# Patient Record
Sex: FEMALE | Race: WHITE | NOT HISPANIC OR LATINO | Employment: FULL TIME | URBAN - METROPOLITAN AREA
[De-identification: names, ages, dates, MRNs, and addresses within clinical notes are randomized per-mention and may not be internally consistent; named-entity substitution may affect disease eponyms.]

---

## 2017-01-19 ENCOUNTER — TELEPHONE (OUTPATIENT)
Dept: BARIATRICS | Facility: CLINIC | Age: 57
End: 2017-01-19

## 2017-02-27 ENCOUNTER — NURSE TRIAGE (OUTPATIENT)
Dept: ADMINISTRATIVE | Facility: CLINIC | Age: 57
End: 2017-02-27

## 2017-02-27 NOTE — TELEPHONE ENCOUNTER
Reason for Disposition   Cough with cold symptoms (e.g., runny nose, postnasal drip, throat clearing) (all triage questions negative)    Protocols used:  COUGH - ACUTE [id number]    Pt calling with complaints of coughing, fever/temp 100.8 and sinus/nasal congestion. Care Advice given.  Advised UC if needed.

## 2017-05-04 RX ORDER — FLUOXETINE HYDROCHLORIDE 40 MG/1
CAPSULE ORAL
Qty: 30 CAPSULE | Refills: 5 | Status: SHIPPED | OUTPATIENT
Start: 2017-05-04 | End: 2019-05-28 | Stop reason: SDUPTHER

## 2018-01-30 ENCOUNTER — PATIENT MESSAGE (OUTPATIENT)
Dept: FAMILY MEDICINE | Facility: CLINIC | Age: 58
End: 2018-01-30

## 2018-02-15 ENCOUNTER — DOCUMENTATION ONLY (OUTPATIENT)
Dept: FAMILY MEDICINE | Facility: CLINIC | Age: 58
End: 2018-02-15

## 2018-02-15 NOTE — PROGRESS NOTES
Pre-Visit Chart Review  For Appointment Scheduled on 2-21-18    Health Maintenance Due   Topic Date Due    Pap Smear with HPV Cotest  02/01/2016    Influenza Vaccine  08/01/2017

## 2018-02-21 ENCOUNTER — OFFICE VISIT (OUTPATIENT)
Dept: FAMILY MEDICINE | Facility: CLINIC | Age: 58
End: 2018-02-21
Attending: FAMILY MEDICINE
Payer: COMMERCIAL

## 2018-02-21 ENCOUNTER — PATIENT MESSAGE (OUTPATIENT)
Dept: FAMILY MEDICINE | Facility: CLINIC | Age: 58
End: 2018-02-21

## 2018-02-21 ENCOUNTER — LAB VISIT (OUTPATIENT)
Dept: LAB | Facility: HOSPITAL | Age: 58
End: 2018-02-21
Attending: FAMILY MEDICINE
Payer: COMMERCIAL

## 2018-02-21 VITALS
OXYGEN SATURATION: 96 % | HEART RATE: 68 BPM | TEMPERATURE: 98 F | WEIGHT: 261.25 LBS | RESPIRATION RATE: 16 BRPM | DIASTOLIC BLOOD PRESSURE: 74 MMHG | SYSTOLIC BLOOD PRESSURE: 122 MMHG | HEIGHT: 63 IN | BODY MASS INDEX: 46.29 KG/M2

## 2018-02-21 DIAGNOSIS — E78.5 HYPERLIPIDEMIA, UNSPECIFIED HYPERLIPIDEMIA TYPE: ICD-10-CM

## 2018-02-21 DIAGNOSIS — Z00.00 ENCOUNTER FOR PREVENTIVE HEALTH EXAMINATION: Primary | ICD-10-CM

## 2018-02-21 DIAGNOSIS — F41.9 ANXIETY: ICD-10-CM

## 2018-02-21 DIAGNOSIS — Z00.00 ENCOUNTER FOR PREVENTIVE HEALTH EXAMINATION: ICD-10-CM

## 2018-02-21 DIAGNOSIS — F32.5 MAJOR DEPRESSION IN REMISSION: ICD-10-CM

## 2018-02-21 DIAGNOSIS — E66.01 MORBID OBESITY WITH BMI OF 45.0-49.9, ADULT: ICD-10-CM

## 2018-02-21 LAB
ALBUMIN SERPL BCP-MCNC: 3.5 G/DL
ALP SERPL-CCNC: 88 U/L
ALT SERPL W/O P-5'-P-CCNC: 19 U/L
ANION GAP SERPL CALC-SCNC: 8 MMOL/L
AST SERPL-CCNC: 19 U/L
BASOPHILS # BLD AUTO: 0.07 K/UL
BASOPHILS NFR BLD: 0.9 %
BILIRUB SERPL-MCNC: 0.4 MG/DL
BUN SERPL-MCNC: 19 MG/DL
CALCIUM SERPL-MCNC: 9.1 MG/DL
CHLORIDE SERPL-SCNC: 105 MMOL/L
CHOLEST SERPL-MCNC: 185 MG/DL
CHOLEST/HDLC SERPL: 4.4 {RATIO}
CO2 SERPL-SCNC: 27 MMOL/L
CREAT SERPL-MCNC: 0.8 MG/DL
DIFFERENTIAL METHOD: NORMAL
EOSINOPHIL # BLD AUTO: 0.2 K/UL
EOSINOPHIL NFR BLD: 2.5 %
ERYTHROCYTE [DISTWIDTH] IN BLOOD BY AUTOMATED COUNT: 13.6 %
EST. GFR  (AFRICAN AMERICAN): >60 ML/MIN/1.73 M^2
EST. GFR  (NON AFRICAN AMERICAN): >60 ML/MIN/1.73 M^2
GLUCOSE SERPL-MCNC: 95 MG/DL
HCT VFR BLD AUTO: 39.1 %
HDLC SERPL-MCNC: 42 MG/DL
HDLC SERPL: 22.7 %
HGB BLD-MCNC: 12.7 G/DL
IMM GRANULOCYTES # BLD AUTO: 0.02 K/UL
IMM GRANULOCYTES NFR BLD AUTO: 0.2 %
LDLC SERPL CALC-MCNC: 111.6 MG/DL
LYMPHOCYTES # BLD AUTO: 2.1 K/UL
LYMPHOCYTES NFR BLD: 25.8 %
MCH RBC QN AUTO: 29.4 PG
MCHC RBC AUTO-ENTMCNC: 32.5 G/DL
MCV RBC AUTO: 91 FL
MONOCYTES # BLD AUTO: 0.6 K/UL
MONOCYTES NFR BLD: 7 %
NEUTROPHILS # BLD AUTO: 5.1 K/UL
NEUTROPHILS NFR BLD: 63.6 %
NONHDLC SERPL-MCNC: 143 MG/DL
NRBC BLD-RTO: 0 /100 WBC
PLATELET # BLD AUTO: 307 K/UL
PMV BLD AUTO: 10.4 FL
POTASSIUM SERPL-SCNC: 4.4 MMOL/L
PROT SERPL-MCNC: 7 G/DL
RBC # BLD AUTO: 4.32 M/UL
SODIUM SERPL-SCNC: 140 MMOL/L
TRIGL SERPL-MCNC: 157 MG/DL
WBC # BLD AUTO: 8.03 K/UL

## 2018-02-21 PROCEDURE — 80061 LIPID PANEL: CPT

## 2018-02-21 PROCEDURE — 85025 COMPLETE CBC W/AUTO DIFF WBC: CPT

## 2018-02-21 PROCEDURE — 36415 COLL VENOUS BLD VENIPUNCTURE: CPT | Mod: PO

## 2018-02-21 PROCEDURE — 99999 PR PBB SHADOW E&M-EST. PATIENT-LVL IV: CPT | Mod: PBBFAC,,, | Performed by: FAMILY MEDICINE

## 2018-02-21 PROCEDURE — 80053 COMPREHEN METABOLIC PANEL: CPT

## 2018-02-21 PROCEDURE — 99396 PREV VISIT EST AGE 40-64: CPT | Mod: S$GLB,,, | Performed by: FAMILY MEDICINE

## 2018-02-21 RX ORDER — BUSPIRONE HYDROCHLORIDE 10 MG/1
10 TABLET ORAL 3 TIMES DAILY PRN
COMMUNITY
Start: 2016-08-12 | End: 2019-07-18

## 2018-02-21 NOTE — PROGRESS NOTES
Subjective:       Patient ID: Amanda Clark is a 58 y.o. female.    Chief Complaint: Annual Exam    58-year-old female coming in for physical exam.  She has a history of hyperlipidemia, anxiety and depression, arthritis, chronic back pain with herniated disc and laminectomy in .  She also has psoriasis and arthritis with bilateral total knee replacements.  She has no major complaints at this time but his gained some weight recently and has just started back on the [employer] program.  Her Pap smear and mammogram were recently done by Dr. Garcia with a mild abnormality on mammogram.  She was recommended to have a follow-up in 6 months for what is believed to be a cyst.  Her health maintenance is otherwise up-to-date.    Past Medical History:  No date: Anxiety  No date: Arthritis  No date: Back pain  No date: Cancer      Comment: precancerous skin lesion  No date: Depression  No date: Herniated disc      Comment: L-4   No date: Hyperlipidemia  No date: Knee pain  No date: Psoriasis    Past Surgical History:  No date: ADENOIDECTOMY  No date: arthroscopic left knee  No date: BACK injections      Comment:  & 2013  No date:  SECTION  1/25/10: COLONOSCOPY      Comment: Dr. Coronado, 10 year recheck  2016: JOINT REPLACEMENT      Comment: right knee   2015: JOINT REPLACEMENT      Comment: left knee   2015: LAMINOTOMY      Comment: Dr Lazo  No date: STEROID INJECTION KNEE  No date: TONSILLECTOMY  No date: vocal cord nodule removal    Review of patient's family history indicates:    Heart disease                  Mother                    Diabetes                       Mother                    Psoriasis                      Mother                    Cancer                         Father                      Comment: bone, prostate    Heart disease                  Sister                    Diabetes                       Sister                    Hypertension                    Sister                    Depression                     Sister                    Heart disease                  Maternal Aunt             Dementia                       Maternal Aunt             Cancer                         Paternal Uncle              Comment: tumor abdominal    Heart disease                  Paternal Uncle            Dementia                       Paternal Uncle            No Known Problems              Daughter                  Melanoma                       Neg Hx                    Lupus                          Neg Hx                    Eczema                         Neg Hx                    Social History    Marital status:             Spouse name:                       Years of education:                 Number of children:               Occupational History  Occupation          Employer            Comment                                   Social Security De*     Social History Main Topics    Smoking status: Former Smoker                                                                Packs/day: 0.00      Years: 0.00           Types: Cigarettes       Quit date: 2/27/1988    Smokeless tobacco: Never Used                        Alcohol use: Yes                Comment: sometimes    Drug use: No              Sexual activity: No                       Current Outpatient Prescriptions:     busPIRone (BUSPAR) 10 MG tablet, Take 10 mg by mouth 3 (three) times daily as needed., Disp: , Rfl:     clobetasol (TEMOVATE) 0.05 % external solution, Use on scalp one - two times daily as needed for scaling or itching, Disp: 60 mL, Rfl: 3    fluoxetine (PROZAC) 40 MG capsule, TAKE ONE CAPSULE BY MOUTH ONCE DAILY (Patient taking differently: TAKE ONE CAPSULE BY MOUTH ONCE DAILY prn), Disp: 30 capsule, Rfl: 5    hydrocortisone 2.5 % cream, AAA face TWICE DAILY for 1-2 weeks PRN flares, Disp: 30 g, Rfl: 2    JUBLIA 10 % Melva, Apply to affected nail(s) qd, Disp: 4 mL, Rfl: 3    ketoconazole  (NIZORAL) 2 % shampoo, Wash hair with medicated shampoo at least 2x/week - let sit on scalp at least 5 minutes prior to rinsing (Patient taking differently: daily as needed. Wash hair with medicated shampoo at least 2x/week - let sit on scalp at least 5 minutes prior to rinsing), Disp: 120 mL, Rfl: 5    Pap Smear with HPV Cotest due on 01/30/1980-done, records requested        Review of Systems   Constitutional: Negative for chills, diaphoresis, fatigue, fever and unexpected weight change.   HENT: Negative for congestion, ear pain, hearing loss, postnasal drip and sinus pressure.    Eyes: Negative for itching and visual disturbance.   Respiratory: Negative for cough, chest tightness, shortness of breath and wheezing.    Cardiovascular: Negative for chest pain, palpitations and leg swelling.   Gastrointestinal: Negative for abdominal pain, blood in stool, constipation, diarrhea, nausea and vomiting.   Genitourinary: Negative for dysuria, frequency, hematuria, menstrual problem, pelvic pain, vaginal bleeding and vaginal discharge.   Musculoskeletal: Negative for arthralgias, back pain, joint swelling and myalgias.   Neurological: Negative for dizziness and headaches.   Hematological: Negative for adenopathy.   Psychiatric/Behavioral: Negative for sleep disturbance. The patient is not nervous/anxious.        Objective:      Physical Exam   Constitutional: She is oriented to person, place, and time. She appears well-developed. No distress.   Good blood pressure control  Morbidly obese with a BMI of 46.7 she is up 10.5 pounds from her last visit with me May 18, 2016   HENT:   Head: Normocephalic and atraumatic.   Right Ear: External ear normal.   Left Ear: External ear normal.   Nose: Nose normal.   Mouth/Throat: Oropharynx is clear and moist. No oropharyngeal exudate.   Eyes: Conjunctivae are normal. Pupils are equal, round, and reactive to light. Right eye exhibits no discharge. Left eye exhibits no discharge. No  scleral icterus.   Neck: Normal range of motion. Neck supple. No JVD present. No tracheal deviation present. No thyromegaly present.   Cardiovascular: Normal rate, regular rhythm and normal heart sounds.  Exam reveals no gallop and no friction rub.    No murmur heard.  Carotid pulses 2+ no bruit   Pulmonary/Chest: Effort normal and breath sounds normal. No respiratory distress. She has no wheezes. She has no rales. She exhibits no tenderness.   Abdominal: Soft. Bowel sounds are normal. She exhibits no distension and no mass. There is no tenderness. There is no rebound and no guarding.   Musculoskeletal: Normal range of motion. She exhibits no edema or tenderness.   Lymphadenopathy:     She has no cervical adenopathy.   Neurological: She is alert and oriented to person, place, and time. She has normal reflexes. She displays normal reflexes. No cranial nerve deficit or sensory deficit. She exhibits normal muscle tone.   Skin: Skin is warm and dry. No rash noted. She is not diaphoretic. No erythema.   Psychiatric: She has a normal mood and affect. Her behavior is normal. Thought content normal.   Nursing note and vitals reviewed.      Assessment:       1. Encounter for preventive health examination    2. Major depression in remission    3. Anxiety    4. Hyperlipidemia, unspecified hyperlipidemia type    5. Morbid obesity with BMI of 45.0-49.9, adult        Plan:       1. Encounter for preventive health examination  - Comprehensive metabolic panel; Future  - CBC auto differential; Future  - Lipid panel; Future    2. Major depression in remission  She is resuming her Prozac and BuSpar    3. Anxiety  She is resuming her Prozac and BuSpar    4. Hyperlipidemia, unspecified hyperlipidemia type  - Lipid panel; Future    5. Morbid obesity with BMI of 45.0-49.9, adult         Patient readiness: acceptance and barriers:none    During the course of the visit the patient was educated and counseled about the following:     Obesity:    General weight loss/lifestyle modification strategies discussed (elicit support from others; identify saboteurs; non-food rewards, etc).  Informal exercise measures discussed, e.g. taking stairs instead of elevator.  Regular aerobic exercise program discussed.    Goals: Obesity: Reduce calorie intake and BMI    Did patient meet goals/outcomes: No    The following self management tools provided: excercise log    Patient Instructions (the written plan) was given to the patient/family.     Time spent with patient: 45 minutes

## 2018-02-21 NOTE — PATIENT INSTRUCTIONS

## 2018-07-09 ENCOUNTER — PATIENT OUTREACH (OUTPATIENT)
Dept: ADMINISTRATIVE | Facility: HOSPITAL | Age: 58
End: 2018-07-09

## 2018-07-11 ENCOUNTER — OFFICE VISIT (OUTPATIENT)
Dept: FAMILY MEDICINE | Facility: CLINIC | Age: 58
End: 2018-07-11
Attending: FAMILY MEDICINE
Payer: COMMERCIAL

## 2018-07-11 VITALS
SYSTOLIC BLOOD PRESSURE: 132 MMHG | DIASTOLIC BLOOD PRESSURE: 78 MMHG | TEMPERATURE: 98 F | HEIGHT: 62 IN | RESPIRATION RATE: 17 BRPM | OXYGEN SATURATION: 97 % | HEART RATE: 76 BPM | BODY MASS INDEX: 49.37 KG/M2 | WEIGHT: 268.31 LBS

## 2018-07-11 DIAGNOSIS — M70.62 TROCHANTERIC BURSITIS OF BOTH HIPS: Primary | ICD-10-CM

## 2018-07-11 DIAGNOSIS — M70.61 TROCHANTERIC BURSITIS OF BOTH HIPS: Primary | ICD-10-CM

## 2018-07-11 DIAGNOSIS — G56.01 CARPAL TUNNEL SYNDROME OF RIGHT WRIST: ICD-10-CM

## 2018-07-11 PROCEDURE — 99999 PR PBB SHADOW E&M-EST. PATIENT-LVL III: CPT | Mod: PBBFAC,,, | Performed by: FAMILY MEDICINE

## 2018-07-11 PROCEDURE — 99213 OFFICE O/P EST LOW 20 MIN: CPT | Mod: S$GLB,,, | Performed by: FAMILY MEDICINE

## 2018-07-11 PROCEDURE — 3008F BODY MASS INDEX DOCD: CPT | Mod: CPTII,S$GLB,, | Performed by: FAMILY MEDICINE

## 2018-07-11 NOTE — PROGRESS NOTES
Subjective:       Patient ID: Amanda Clark is a 58 y.o. female.    Chief Complaint: Back Pain and Hip Pain    58-year-old female coming in with several concerns.  She's been experiencing pain in both buttocks radiating down over the greater trochanter and into the lateral ventricle anterior thigh at the knee.  It does not radiate below the knee and there is no numbness or tingling down the lower leg.  She has been painting her house climbing a 16 foot high ladder to trim.  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  She did get some relief with Advil and Aleve but she was concerned that she may have a back problem.  Her other complaint is some numbness and tingling in the hands, bilateral but mostly affecting the right side.  This occurs somewhat at work where she spends a lot of time typing on a keyboard and often awakens her at night.    Past Medical History:  No date: Anxiety  No date: Arthritis  No date: Back pain  No date: Cancer      Comment: precancerous skin lesion  No date: Depression  No date: Herniated disc      Comment: L-4   No date: Hyperlipidemia  No date: Knee pain  No date: Psoriasis    Past Surgical History:  No date: ADENOIDECTOMY  No date: arthroscopic left knee  No date: BACK injections      Comment:  & 2013  No date:  SECTION  1/25/10: COLONOSCOPY      Comment: Dr. Coronado, 10 year recheck  2016: JOINT REPLACEMENT      Comment: right knee   2015: JOINT REPLACEMENT      Comment: left knee   2015: LAMINOTOMY      Comment: Dr Lazo  No date: STEROID INJECTION KNEE  No date: TONSILLECTOMY  No date: vocal cord nodule removal    Current Outpatient Prescriptions on File Prior to Visit:  busPIRone (BUSPAR) 10 MG tablet, Take 10 mg by mouth 3 (three) times daily as needed., Disp: , Rfl:   clobetasol (TEMOVATE) 0.05 % external solution, Use on scalp one  - two times daily as needed for scaling or itching, Disp: 60 mL, Rfl: 3  fluoxetine (PROZAC) 40 MG capsule, TAKE ONE CAPSULE BY MOUTH ONCE DAILY (Patient taking differently: TAKE ONE CAPSULE BY MOUTH ONCE DAILY prn), Disp: 30 capsule, Rfl: 5  hydrocortisone 2.5 % cream, AAA face TWICE DAILY for 1-2 weeks PRN flares, Disp: 30 g, Rfl: 2  ketoconazole (NIZORAL) 2 % shampoo, Wash hair with medicated shampoo at least 2x/week - let sit on scalp at least 5 minutes prior to rinsing (Patient taking differently: daily as needed. Wash hair with medicated shampoo at least 2x/week - let sit on scalp at least 5 minutes prior to rinsing), Disp: 120 mL, Rfl: 5  (DISCONTINUED) JUBLIA 10 % Melva, Apply to affected nail(s) qd, Disp: 4 mL, Rfl: 3    No current facility-administered medications on file prior to visit.           Review of Systems   Musculoskeletal: Positive for arthralgias and back pain.   Neurological: Positive for numbness.       Objective:      Physical Exam   Constitutional:   Good blood pressure control  Morbidly obesity with a BMI of 49.1, she is up 7.1 pounds from her last visit with me February 21, 2018   Musculoskeletal:        Right elbow: Normal.       Right wrist: Normal.        Right hip: She exhibits bony tenderness (Very tender over the greater trochanter which reproduces her pain). She exhibits normal range of motion, normal strength, no swelling, no crepitus and no deformity.        Left hip: She exhibits bony tenderness (Very tender over the greater trochanter which reproduces her pain). She exhibits normal range of motion, normal strength, no swelling, no crepitus and no deformity.   Nursing note and vitals reviewed.      Assessment:       1. Trochanteric bursitis of both hips    2. Carpal tunnel syndrome of right wrist        Plan:       1. Trochanteric bursitis of both hips  Stretching exercises, cold compresses/ice packs, Advil and/or Aleve as needed.  If she does not improve readily will send to  physical therapy.  Cortisone shots or an option but I would tend to prefer to avoid them if not absolutely necessary    2. Carpal tunnel syndrome of right wrist  Splint at night, wrist protectors on her keyboard and mouse pad.  May inject if needed

## 2018-11-09 ENCOUNTER — TELEPHONE (OUTPATIENT)
Dept: ADMINISTRATIVE | Facility: HOSPITAL | Age: 58
End: 2018-11-09

## 2018-12-03 ENCOUNTER — DOCUMENTATION ONLY (OUTPATIENT)
Dept: FAMILY MEDICINE | Facility: CLINIC | Age: 58
End: 2018-12-03

## 2018-12-03 NOTE — PROGRESS NOTES
Pre-Visit Chart Review  For Appointment Scheduled on 2-22-19    Health Maintenance Due   Topic Date Due    Pap Smear with HPV Cotest  01/30/1980

## 2018-12-03 NOTE — PROGRESS NOTES
Pre-Visit Chart Review  For Appointment Scheduled on 1-22-19    Health Maintenance Due   Topic Date Due    Pap Smear with HPV Cotest  01/30/1980

## 2019-01-04 DIAGNOSIS — M54.50 LUMBAR PAIN: Primary | ICD-10-CM

## 2019-01-07 ENCOUNTER — OFFICE VISIT (OUTPATIENT)
Dept: DERMATOLOGY | Facility: CLINIC | Age: 59
End: 2019-01-07
Payer: COMMERCIAL

## 2019-01-07 DIAGNOSIS — L81.4 SOLAR LENTIGO: ICD-10-CM

## 2019-01-07 DIAGNOSIS — L29.9 BRACHIORADIAL PRURITUS: ICD-10-CM

## 2019-01-07 DIAGNOSIS — L57.0 ACTINIC KERATOSES: Primary | ICD-10-CM

## 2019-01-07 DIAGNOSIS — L82.1 SEBORRHEIC KERATOSES: ICD-10-CM

## 2019-01-07 PROCEDURE — 17003 DESTRUCTION, PREMALIGNANT LESIONS; SECOND THROUGH 14 LESIONS: ICD-10-PCS | Mod: S$GLB,,, | Performed by: DERMATOLOGY

## 2019-01-07 PROCEDURE — 99213 PR OFFICE/OUTPT VISIT, EST, LEVL III, 20-29 MIN: ICD-10-PCS | Mod: 25,S$GLB,, | Performed by: DERMATOLOGY

## 2019-01-07 PROCEDURE — 99999 PR PBB SHADOW E&M-EST. PATIENT-LVL II: CPT | Mod: PBBFAC,,, | Performed by: DERMATOLOGY

## 2019-01-07 PROCEDURE — 17000 DESTRUCT PREMALG LESION: CPT | Mod: S$GLB,,, | Performed by: DERMATOLOGY

## 2019-01-07 PROCEDURE — 99213 OFFICE O/P EST LOW 20 MIN: CPT | Mod: 25,S$GLB,, | Performed by: DERMATOLOGY

## 2019-01-07 PROCEDURE — 17003 DESTRUCT PREMALG LES 2-14: CPT | Mod: S$GLB,,, | Performed by: DERMATOLOGY

## 2019-01-07 PROCEDURE — 17000 PR DESTRUCTION(LASER SURGERY,CRYOSURGERY,CHEMOSURGERY),PREMALIGNANT LESIONS,FIRST LESION: ICD-10-PCS | Mod: S$GLB,,, | Performed by: DERMATOLOGY

## 2019-01-07 PROCEDURE — 99999 PR PBB SHADOW E&M-EST. PATIENT-LVL II: ICD-10-PCS | Mod: PBBFAC,,, | Performed by: DERMATOLOGY

## 2019-01-07 NOTE — PROGRESS NOTES
Subjective:       Patient ID:  Amanda Clark is a 58 y.o. female who presents for   Chief Complaint   Patient presents with    Spot     forehead    Itching     R arm     Patient last seen 12/2016 with AKs treated with cryo  H/o seb derm and benign lesions  New complaint today      Spot  - Initial  Affected locations: forehead  Duration: 6 months  Signs / symptoms: dryness and non-healing  Severity: mild  Timing: constant  Aggravated by: nothing  Relieving factors/Treatments tried: OTC hydrocortisone  Improvement on treatment: no relief    Itching  - Initial  Affected locations: right arm  Duration: 4 months  Signs / symptoms: itching and dryness  Severity: mild  Timing: constant  Aggravated by: nothing  Relieving factors/Treatments tried: OTC hydrocortisone  Improvement on treatment: no relief        Review of Systems   Constitutional: Negative for fever, chills and fatigue.   Skin: Positive for itching (R arm), activity-related sunscreen use and wears hat. Negative for daily sunscreen use.   Hematologic/Lymphatic: Does not bruise/bleed easily.        Objective:    Physical Exam   Constitutional: She appears well-developed and well-nourished. No distress.   Neurological: She is alert and oriented to person, place, and time. She is not disoriented.   Psychiatric: She has a normal mood and affect.   Skin:   Areas Examined (abnormalities noted in diagram):   Head / Face Inspection Performed  Neck Inspection Performed  Back Inspection Performed  RUE Inspected  LUE Inspection Performed                       Diagram Legend     Erythematous scaling macule/papule c/w actinic keratosis       Vascular papule c/w angioma      Pigmented verrucoid papule/plaque c/w seborrheic keratosis      Yellow umbilicated papule c/w sebaceous hyperplasia      Irregularly shaped tan macule c/w lentigo     1-2 mm smooth white papules consistent with Milia      Movable subcutaneous cyst with punctum c/w epidermal inclusion cyst       Subcutaneous movable cyst c/w pilar cyst      Firm pink to brown papule c/w dermatofibroma      Pedunculated fleshy papule(s) c/w skin tag(s)      Evenly pigmented macule c/w junctional nevus     Mildly variegated pigmented, slightly irregular-bordered macule c/w mildly atypical nevus      Flesh colored to evenly pigmented papule c/w intradermal nevus       Pink pearly papule/plaque c/w basal cell carcinoma      Erythematous hyperkeratotic cursted plaque c/w SCC      Surgical scar with no sign of skin cancer recurrence      Open and closed comedones      Inflammatory papules and pustules      Verrucoid papule consistent consistent with wart     Erythematous eczematous patches and plaques     Dystrophic onycholytic nail with subungual debris c/w onychomycosis     Umbilicated papule    Erythematous-base heme-crusted tan verrucoid plaque consistent with inflamed seborrheic keratosis     Erythematous Silvery Scaling Plaque c/w Psoriasis     See annotation      Assessment / Plan:        Actinic keratoses  Cryosurgery Procedure Note    Verbal consent from the patient is obtained and the patient is aware of the precancerous quality and need for treatment of these lesions. Liquid nitrogen cryosurgery is applied to the 2 actinic keratoses, as detailed in the physical exam, to produce a freeze injury. The patient is aware that blisters may form and is instructed on wound care with gentle cleansing and use of vaseline ointment to keep moist until healed. The patient is supplied a handout on cryosurgery and is instructed to call if lesions do not completely resolve.    Seborrheic keratoses  These are benign inherited growths without a malignant potential. Reassurance given to patient. No treatment is necessary.     Solar lentigo  This is a benign hyperpigmented sun induced lesion. Daily sun protection will reduce the number of new lesions. Treatment of these benign lesions are considered cosmetic.    Brachioradial pruritus vs  other, R forearm  Started last summer, improved in recent months  Denies h/o intense sun exposure  No exanthem to explain pruritus  cerave with pramoxine, strict sun protection    Patient instructed in importance in daily sun protection of at least spf 30. Mineral sunscreen ingredients preferred (Zinc +/- Titanium).   Recommend Elta MD for daily use on face and neck.  Patient encouraged to wear hat for all outdoor exposure.   Also discussed sun avoidance and use of protective clothing.           Follow-up if symptoms worsen or fail to improve.

## 2019-01-07 NOTE — PATIENT INSTRUCTIONS
XEROSIS (DRY SKIN)    Xerosis is the term for dry skin.  We all have a natural oil coating over our skin produced by the skin oil glands.  If this oil is removed, the skin becomes dry which can lead to cracking, which can lead to inflammation. Xerosis is usually a long-term problem that recurs often, especially in the winter.    1. Cause     Long hot baths or showers can remove our natural oil and lead to xerosis.  One should never take more than one bath or shower a day and for no longer than ten minutes.   Use of harsh soaps such as Zest, Dial, and Ivory can worsen and cause xerosis.   Cold winter weather worsens xerosis because the amount of moisture contained in cold air is much less than the amount of moisture in warm air.    2. Treatment     Treatment is intended to restore the natural oil to your skin.  Keep the skin lubricated.     Do not take more than one bath or shower a day.  Use lukewarm water, not hot.  Hot water dries out the skin.     Use a gentle moisturizing soap such as Cetaphil soap, cerave gentle cleanser, Oil of Olay, Dove sensitive bar, Basis, Ivory moisture care, Restoraderm cleanser.     When toweling dry, dont rub.  Blot the skin so there is still some water left on the skin.  Immediately after toweling, you should apply a moisturizing cream from neck to toes such as Cerave cream, Cetaphil cream, Restoraderm or Eucerin Original Formula cream.  Alpha hydroxyacid lotions, i.e., AmLactin or Cerave SA, also work very well for preventing dry skin, but may burn when used on inflamed or reddened skin.     If you like to swim during the winter months, you should not use soap when getting out of the pool.  When you have finished swimming, rinse off the chlorine with cool to warm water.  If this will be the only shower of the day, then you may use Cetaphil or another mild soap to cleanse your skin.  After the shower, apply a moisturizing cream to all of the skin as above.    3. Additional  recommendation:      Use unscented hypoallergenic detergent for your clothes, avoid softener or dryer sheets unless they are unscented and hypoallergenic (all free and clear; downy free and gentle).    [facility]; [address] [place] [address] Tel: [phone] Fax: [phone]

## 2019-01-14 ENCOUNTER — OFFICE VISIT (OUTPATIENT)
Dept: ORTHOPEDICS | Facility: CLINIC | Age: 59
End: 2019-01-14
Payer: COMMERCIAL

## 2019-01-14 VITALS
WEIGHT: 262 LBS | DIASTOLIC BLOOD PRESSURE: 70 MMHG | SYSTOLIC BLOOD PRESSURE: 118 MMHG | HEART RATE: 82 BPM | HEIGHT: 62 IN | BODY MASS INDEX: 48.21 KG/M2

## 2019-01-14 DIAGNOSIS — Z98.890 HISTORY OF LUMBAR LAMINECTOMY: Primary | ICD-10-CM

## 2019-01-14 DIAGNOSIS — M48.062 LUMBAR STENOSIS WITH NEUROGENIC CLAUDICATION: ICD-10-CM

## 2019-01-14 PROCEDURE — 99203 PR OFFICE/OUTPT VISIT, NEW, LEVL III, 30-44 MIN: ICD-10-PCS | Mod: ,,, | Performed by: ORTHOPAEDIC SURGERY

## 2019-01-14 PROCEDURE — 99203 OFFICE O/P NEW LOW 30 MIN: CPT | Mod: ,,, | Performed by: ORTHOPAEDIC SURGERY

## 2019-01-14 PROCEDURE — 3008F PR BODY MASS INDEX (BMI) DOCUMENTED: ICD-10-PCS | Mod: ,,, | Performed by: ORTHOPAEDIC SURGERY

## 2019-01-14 PROCEDURE — 3008F BODY MASS INDEX DOCD: CPT | Mod: ,,, | Performed by: ORTHOPAEDIC SURGERY

## 2019-01-14 NOTE — PROGRESS NOTES
Subjective:       Patient ID: Amanda Clark is a 58 y.o. female.    Chief Complaint: Pain of the Lumbar Spine (Lumbar pain x 6 months with bilateral leg weakness to knees with tingling. Limited standing and walking off and on. No bowel or bladder problems.)      History of Present Illness  Rosa presents with back and leg pain bilateral leg pains radiating down both legs to level of her knees. She had a laminectomy at L2 and L3 performed by myself in 2015 and did well until 6 months ago. Back pain is greater than leg pain. No bowel or bladder incontinence.    Current Medications  Current Outpatient Medications   Medication Sig Dispense Refill    busPIRone (BUSPAR) 10 MG tablet Take 10 mg by mouth 3 (three) times daily as needed.      clobetasol (TEMOVATE) 0.05 % external solution Use on scalp one - two times daily as needed for scaling or itching 60 mL 3    fluoxetine (PROZAC) 40 MG capsule TAKE ONE CAPSULE BY MOUTH ONCE DAILY (Patient taking differently: TAKE ONE CAPSULE BY MOUTH ONCE DAILY prn) 30 capsule 5    hydrocortisone 2.5 % cream AAA face TWICE DAILY for 1-2 weeks PRN flares 30 g 2    ketoconazole (NIZORAL) 2 % shampoo Wash hair with medicated shampoo at least 2x/week - let sit on scalp at least 5 minutes prior to rinsing (Patient taking differently: daily as needed. Wash hair with medicated shampoo at least 2x/week - let sit on scalp at least 5 minutes prior to rinsing) 120 mL 5     No current facility-administered medications for this visit.        Allergies  Review of patient's allergies indicates:   Allergen Reactions    No known drug allergies        Past Medical History  Past Medical History:   Diagnosis Date    Anxiety     Arthritis     Back pain     Cancer     precancerous skin lesion    Depression     Herniated disc     L-4     Hyperlipidemia     Knee pain     Psoriasis        Surgical History  Past Surgical History:   Procedure Laterality Date    ADENOIDECTOMY       arthroscopic left knee      BACK injections       & 2013     SECTION      COLONOSCOPY  1/25/10    Dr. Coronado, 10 year recheck    JOINT REPLACEMENT  2016    right knee     JOINT REPLACEMENT  2015    left knee     LAMINOTOMY  2015    Dr Lazo    STEROID INJECTION KNEE      TONSILLECTOMY      vocal cord nodule removal         Family History:   Family History   Problem Relation Age of Onset    Heart disease Mother     Diabetes Mother     Psoriasis Mother     Cancer Father         bone, prostate    Heart disease Sister     Diabetes Sister     Hypertension Sister     Depression Sister     Heart disease Maternal Aunt     Dementia Maternal Aunt     Cancer Paternal Uncle         tumor abdominal    Heart disease Paternal Uncle     Dementia Paternal Uncle     No Known Problems Daughter     Melanoma Neg Hx     Lupus Neg Hx     Eczema Neg Hx        Social History:   Social History     Socioeconomic History    Marital status:      Spouse name: Not on file    Number of children: Not on file    Years of education: Not on file    Highest education level: Not on file   Social Needs    Financial resource strain: Not on file    Food insecurity - worry: Not on file    Food insecurity - inability: Not on file    Transportation needs - medical: Not on file    Transportation needs - non-medical: Not on file   Occupational History     Employer: [employer]   Tobacco Use    Smoking status: Former Smoker     Types: Cigarettes     Last attempt to quit: 1988     Years since quittin.9    Smokeless tobacco: Never Used   Substance and Sexual Activity    Alcohol use: Yes     Comment: sometimes    Drug use: No    Sexual activity: No     Birth control/protection: None   Other Topics Concern    Are you pregnant or think you may be? Not Asked    Breast-feeding Not Asked   Social History Narrative    Not on file       Hospitalization/Major  Diagnostic Procedure:     Review of Systems     General/Constitutional:  Chills denies. Fatigue denies. Fever denies. Weight gain denies. Weight loss denies.    Respiratory:  Shortness of breath denies.    Cardiovascular:  Chest pain denies.    Gastrointestinal:  Constipation denies. Diarrhea denies. Nausea denies. Vomiting denies.     Hematology:  Easy bruising denies. Prolonged bleeding denies.     Genitourinary:  Frequent urination denies. Pain in lower back denies. Painful urination denies.     Musculoskeletal:  See HPI for details    Skin:  Rash denies.    Neurologic:  Dizziness denies. Gait abnormalities denies. Seizures denies. Tingling/Numbess denies.    Psychiatric:  Anxiety denies. Depressed mood denies.     Objective:   Vital Signs:   Vitals:    01/14/19 0848   BP: 118/70   Pulse: 82        Physical Exam      General Examination:     Constitutional: The patient is alert and oriented to lace person and time. Mood is pleasant.     Head/Face: Normal facial features normal eyebrows    Eyes: Normal extraocular motion bilaterally    Lungs: Respirations are equal and unlabored    Gait is coordinated.    Cardiovascular: There are no swelling or varicosities present.    Lymphatic: Negative for adenopathy    Skin: Normal    Neurological: Level of consciousness normal. Oriented to place person and time and situation    Psychiatric: Oriented to time place person and situation    Lumbar exam shows well-healed lumbar incision moderate tenderness bilaterally L3-4 with mild spasm range of motion moderately restricted straight leg raising negative bilaterally reflexes symmetrical but hypoactive.    XRAY Report/ Interpretation : Prior x-rays were reviewed. AP pelvis x-ray was taken today. Indications low back pain and/or hip pain. Findings AP pelvis x-ray appears to be normal with no evidence of fractures or significant degenerative diseaseAP and lateral x-rays of the lumbar spine will performed today. Indications postop  lumbar spine surgery. Findings: Evidence of previous laminotomy on the left side L2-3 L3-4. Moderate asymmetrical disc space narrowing noted L3-4 and L4-5.      Assessment:       1. History of lumbar laminectomy    2. Lumbar stenosis with neurogenic claudication        Plan:       Amanda was seen today for pain.    Diagnoses and all orders for this visit:    History of lumbar laminectomy    Lumbar stenosis with neurogenic claudication         No Follow-up on file.    Treatment options were discussed lumbar MRI with and without gadolinium advised. Referral to outpatient physical therapy.    This note was created using Dragon voice recognition software that occasionally misinterpreted phrases or words.

## 2019-01-14 NOTE — LETTER
January 14, 2019      Demetris Alves MD  [address]  [address]  [facility] [address]           [facility]  [address] [facility] [address]  [place] [address]  Phone: [phone]  Fax: [phone]          Patient: Amanda Clark   MR Number: [id number]   YOB: 1960   Date of Visit: 1/14/2019       Dear Dr. Demetris Alves:    Thank you for referring Amanda Clark to me for evaluation. Attached you will find relevant portions of my assessment and plan of care.    If you have questions, please do not hesitate to call me. I look forward to following Amanda Clark along with you.    Sincerely,    Rajesh Lazo MD    Enclosure  CC:  No Recipients    If you would like to receive this communication electronically, please contact [email or web address] or [phone] to request more information on [employer] Link access.    For providers and/or their staff who would like to refer a patient to [facility], please contact us through our one-stop-shop provider referral line, [facility], at [phone].    If you feel you have received this communication in error or would no longer like to receive these types of communications, please e-mail [email or web address]

## 2019-01-18 ENCOUNTER — TELEPHONE (OUTPATIENT)
Dept: ORTHOPEDICS | Facility: CLINIC | Age: 59
End: 2019-01-18

## 2019-01-18 NOTE — TELEPHONE ENCOUNTER
Pt called she would like the order for p/t to be changed to [facility] p/t in [place] fax# [phone] pts# [phone]

## 2019-01-28 ENCOUNTER — OFFICE VISIT (OUTPATIENT)
Dept: ORTHOPEDICS | Facility: CLINIC | Age: 59
End: 2019-01-28
Payer: COMMERCIAL

## 2019-01-28 VITALS
SYSTOLIC BLOOD PRESSURE: 128 MMHG | WEIGHT: 261 LBS | BODY MASS INDEX: 48.03 KG/M2 | HEART RATE: 73 BPM | HEIGHT: 62 IN | DIASTOLIC BLOOD PRESSURE: 80 MMHG

## 2019-01-28 DIAGNOSIS — M47.816 FACET ARTHRITIS OF LUMBAR REGION: Primary | ICD-10-CM

## 2019-01-28 DIAGNOSIS — Z98.890 HISTORY OF LUMBAR LAMINECTOMY: ICD-10-CM

## 2019-01-28 DIAGNOSIS — M51.36 DISC DEGENERATION, LUMBAR: ICD-10-CM

## 2019-01-28 PROCEDURE — 3008F PR BODY MASS INDEX (BMI) DOCUMENTED: ICD-10-PCS | Mod: ,,, | Performed by: ORTHOPAEDIC SURGERY

## 2019-01-28 PROCEDURE — 3008F BODY MASS INDEX DOCD: CPT | Mod: ,,, | Performed by: ORTHOPAEDIC SURGERY

## 2019-01-28 PROCEDURE — 99213 OFFICE O/P EST LOW 20 MIN: CPT | Mod: ,,, | Performed by: ORTHOPAEDIC SURGERY

## 2019-01-28 PROCEDURE — 99213 PR OFFICE/OUTPT VISIT, EST, LEVL III, 20-29 MIN: ICD-10-PCS | Mod: ,,, | Performed by: ORTHOPAEDIC SURGERY

## 2019-01-28 NOTE — PROGRESS NOTES
Subjective:       Patient ID: Amanda Clark is a 58 y.o. female.    Chief Complaint: Pain of the Lumbar Spine (MRI results)      History of Present Illness  Status post L2 and L3 laminectomy many years ago. Patient still has residual back pain and radiation to both thighs in a nonradicular pattern. She to discuss results of MRI.    Current Medications  Current Outpatient Medications   Medication Sig Dispense Refill    busPIRone (BUSPAR) 10 MG tablet Take 10 mg by mouth 3 (three) times daily as needed.      clobetasol (TEMOVATE) 0.05 % external solution Use on scalp one - two times daily as needed for scaling or itching 60 mL 3    fluoxetine (PROZAC) 40 MG capsule TAKE ONE CAPSULE BY MOUTH ONCE DAILY (Patient taking differently: TAKE ONE CAPSULE BY MOUTH ONCE DAILY prn) 30 capsule 5    hydrocortisone 2.5 % cream AAA face TWICE DAILY for 1-2 weeks PRN flares 30 g 2    ketoconazole (NIZORAL) 2 % shampoo Wash hair with medicated shampoo at least 2x/week - let sit on scalp at least 5 minutes prior to rinsing (Patient taking differently: daily as needed. Wash hair with medicated shampoo at least 2x/week - let sit on scalp at least 5 minutes prior to rinsing) 120 mL 5     No current facility-administered medications for this visit.        Allergies  Review of patient's allergies indicates:   Allergen Reactions    No known drug allergies        Past Medical History  Past Medical History:   Diagnosis Date    Anxiety     Arthritis     Back pain     Cancer     precancerous skin lesion    Depression     Herniated disc     L-4     Hyperlipidemia     Knee pain     Psoriasis        Surgical History  Past Surgical History:   Procedure Laterality Date    ADENOIDECTOMY      arthroscopic left knee      BACK injections       & 2013     SECTION      COLONOSCOPY  1/25/10    Dr. Coronado, 10 year recheck    JOINT REPLACEMENT  2016    right knee     JOINT REPLACEMENT  2015    left knee      LAMINOTOMY  2015    Dr Lazo    STEROID INJECTION KNEE      TONSILLECTOMY      vocal cord nodule removal         Family History:   Family History   Problem Relation Age of Onset    Heart disease Mother     Diabetes Mother     Psoriasis Mother     Cancer Father         bone, prostate    Heart disease Sister     Diabetes Sister     Hypertension Sister     Depression Sister     Heart disease Maternal Aunt     Dementia Maternal Aunt     Cancer Paternal Uncle         tumor abdominal    Heart disease Paternal Uncle     Dementia Paternal Uncle     No Known Problems Daughter     Melanoma Neg Hx     Lupus Neg Hx     Eczema Neg Hx        Social History:   Social History     Socioeconomic History    Marital status:      Spouse name: Not on file    Number of children: Not on file    Years of education: Not on file    Highest education level: Not on file   Social Needs    Financial resource strain: Not on file    Food insecurity - worry: Not on file    Food insecurity - inability: Not on file    Transportation needs - medical: Not on file    Transportation needs - non-medical: Not on file   Occupational History     Employer: [employer]   Tobacco Use    Smoking status: Former Smoker     Types: Cigarettes     Last attempt to quit: 1988     Years since quittin.9    Smokeless tobacco: Never Used   Substance and Sexual Activity    Alcohol use: Yes     Comment: sometimes    Drug use: No    Sexual activity: No     Birth control/protection: None   Other Topics Concern    Are you pregnant or think you may be? Not Asked    Breast-feeding Not Asked   Social History Narrative    Not on file       Hospitalization/Major Diagnostic Procedure:     Review of Systems     General/Constitutional:  Chills denies. Fatigue denies. Fever denies. Weight gain denies. Weight loss denies.    Respiratory:  Shortness of breath denies.    Cardiovascular:  Chest pain  denies.    Gastrointestinal:  Constipation denies. Diarrhea denies. Nausea denies. Vomiting denies.     Hematology:  Easy bruising denies. Prolonged bleeding denies.     Genitourinary:  Frequent urination denies. Pain in lower back denies. Painful urination denies.     Musculoskeletal:  See HPI for details    Skin:  Rash denies.    Neurologic:  Dizziness denies. Gait abnormalities denies. Seizures denies. Tingling/Numbess denies.    Psychiatric:  Anxiety denies. Depressed mood denies.     Objective:   Vital Signs:   Vitals:    01/28/19 0851   BP: 128/80   Pulse: 73        Physical Exam      General Examination:     Constitutional: The patient is alert and oriented to lace person and time. Mood is pleasant.     Head/Face: Normal facial features normal eyebrows    Eyes: Normal extraocular motion bilaterally    Lungs: Respirations are equal and unlabored    Gait is coordinated.    Cardiovascular: There are no swelling or varicosities present.    Lymphatic: Negative for adenopathy    Skin: Normal    Neurological: Level of consciousness normal. Oriented to place person and time and situation    Psychiatric: Oriented to time place person and situation    Exam shows well-healed incisions moderate tenderness paraspinous muscles and a moderate restriction of motion without spasm straight leg raising is negative  XRAY Report/ Interpretation : Prior x-rays were reviewed. AP pelvis x-ray was taken today. Indications low back pain and/or hip pain. Findings AP pelvis x-ray appears to be normal with no evidence of fractures or significant degenerative diseaseAP and lateral x-rays of the lumbar spine will performed today. Indications postop lumbar spine surgery. Findings: Evidence of previous laminotomy on the left side L2-3 L3-4. Moderate asymmetrical disc space narrowing noted L3-4   New MRI personally reviewed diminished signal intensity at L3-4 and L4-5 levels. Moderate multilevel facet arthritis. Left-sided disc protrusion  L3-4 and right-sided disc protrusion foraminal L4-5 both of which I think are asymptomatic  XRAY Report/ Interpretation:       Assessment:       1. Facet arthritis of lumbar region    2. History of lumbar laminectomy    3. Disc degeneration, lumbar        Plan:       Amanda was seen today for pain.    Diagnoses and all orders for this visit:    Facet arthritis of lumbar region    History of lumbar laminectomy  -     Ambulatory referral to Pain Clinic    Disc degeneration, lumbar  -     Ambulatory referral to Pain Clinic         Follow-up in about 6 weeks (around 3/11/2019) for Lumbar MBB follow up.    More likely not the cause of her pain as either discogenic from 2 level degenerative disc disease are from a facet joint arthritis she does not want have any surgery therefore we will refer her for lumbar medial branch blocks and possible rhizotomy literature was given regards to those type of treatment    This note was created using Dragon voice recognition software that occasionally misinterpreted phrases or words.

## 2019-01-28 NOTE — PATIENT INSTRUCTIONS
[facility]  [facility]      STEP 1: Diagnostic Medial Branch Blocks (Facet Nerve Blocks)    What are medial branch nerves? Why are medial branch blocks helpful?     Medial branch nerves are very small nerve branches that communicate pain caused by the facet joints in the spine. These nerves do not control any muscles or sensation in the arms or legs. They are located along a bony groove in the spine.     If this procedure has been scheduled, there is strong evidence to suspect that the facet joints are the source of your pain. Benefit may be obtained from having these medial branch nerves temporarily blocked with an anesthetic to see if a more permanent way of blocking these nerves would provide pain relief for a longer term. Blocking these medial branch nerves temporarily stops the transmission of pain signals from the facet joints to the brain. If you receive temporary relief of a portion of your pain after these injections you will likely benefit from radiofrequency ablation of the same nerves. Radiofrequency ablation provides the same amount of relief as the diagnostic blocks, but lasts approximately 6-12 months.     What happens during medial branch blocks?    An IV will be started, so that sedation can be given. You will be placed on the x-ray table and positioned in such a way that the physician can best visualize the bony areas where the medial branch nerves pass. The skin is cleansed using sterile scrub (soap). Next the physician numbs a small area of skin with numbing medicine. The medicine stings for several seconds. Using x-ray guidance, your physician directs a small needle, near the specific nerve or nerves being tested. A small mixture of numbing medicine (anesthetic) and anti-inflammatory (cortisone/steroid) is then injected.    What happens after the procedure?  Immediately after the procedure, you will go back to the recovery area where you will be monitored for  30-60 minutes. You will be asked to report the immediate percentage of pain relief and record the relief experienced during that day on a post injection evaluation sheet (pain diary). You must call and set up a follow up appointment for two weeks after the procedure to discuss the results from this block and determine if you will proceed to step 2 of the treatment of your facet nerves.       .eo

## 2019-02-11 ENCOUNTER — TELEPHONE (OUTPATIENT)
Dept: ADMINISTRATIVE | Facility: HOSPITAL | Age: 59
End: 2019-02-11

## 2019-02-11 NOTE — LETTER
February 11, 2019    [facility]             [facility]  [address]  [facility] [address]  Phone: [phone] February 11, 2019     Patient: Amanda Clark    YOB: 1960   Date of Visit: 2/11/2019       We are seeing Amanda Clark in the clinic today at [facility].  Brennon Cruz MD is their PCP.  She has an outstanding lab/procedure at this time when reviewing their chart.  To help with our Health Maintenance records will you please supply the following:      [x]  Mammogram                                                []  Colonoscopy   []  Pap Smear                                                    []  Outside Lab Results   []  Dexa scans                                                   []  Eye Exam   []  Foot Exam                                                     [] Other___________   []  Outside Immunizations                                               Please Fax to [facility] at [phone]         Sincerely      [facility]  [address] [facility] [address]  Phone [phone]  Fax [phone]

## 2019-02-12 ENCOUNTER — TELEPHONE (OUTPATIENT)
Dept: ADMINISTRATIVE | Facility: HOSPITAL | Age: 59
End: 2019-02-12

## 2019-02-22 ENCOUNTER — OFFICE VISIT (OUTPATIENT)
Dept: FAMILY MEDICINE | Facility: CLINIC | Age: 59
End: 2019-02-22
Attending: FAMILY MEDICINE
Payer: COMMERCIAL

## 2019-02-22 ENCOUNTER — LAB VISIT (OUTPATIENT)
Dept: LAB | Facility: HOSPITAL | Age: 59
End: 2019-02-22
Attending: FAMILY MEDICINE
Payer: COMMERCIAL

## 2019-02-22 VITALS
HEART RATE: 80 BPM | BODY MASS INDEX: 48.03 KG/M2 | SYSTOLIC BLOOD PRESSURE: 124 MMHG | RESPIRATION RATE: 16 BRPM | HEIGHT: 62 IN | DIASTOLIC BLOOD PRESSURE: 72 MMHG | TEMPERATURE: 98 F | WEIGHT: 261 LBS

## 2019-02-22 DIAGNOSIS — F41.9 ANXIETY: ICD-10-CM

## 2019-02-22 DIAGNOSIS — M48.062 SPINAL STENOSIS OF LUMBAR REGION WITH NEUROGENIC CLAUDICATION: ICD-10-CM

## 2019-02-22 DIAGNOSIS — E66.01 MORBID OBESITY WITH BMI OF 45.0-49.9, ADULT: ICD-10-CM

## 2019-02-22 DIAGNOSIS — G56.03 BILATERAL CARPAL TUNNEL SYNDROME: ICD-10-CM

## 2019-02-22 DIAGNOSIS — Z00.00 ENCOUNTER FOR PREVENTIVE HEALTH EXAMINATION: ICD-10-CM

## 2019-02-22 DIAGNOSIS — M19.90 ARTHRITIS: ICD-10-CM

## 2019-02-22 DIAGNOSIS — F32.5 MAJOR DEPRESSION IN REMISSION: ICD-10-CM

## 2019-02-22 DIAGNOSIS — E78.5 HYPERLIPIDEMIA, UNSPECIFIED HYPERLIPIDEMIA TYPE: ICD-10-CM

## 2019-02-22 DIAGNOSIS — Z00.00 ENCOUNTER FOR PREVENTIVE HEALTH EXAMINATION: Primary | ICD-10-CM

## 2019-02-22 DIAGNOSIS — L40.9 PSORIASIS: ICD-10-CM

## 2019-02-22 LAB
ALBUMIN SERPL BCP-MCNC: 3.8 G/DL
ALP SERPL-CCNC: 89 U/L
ALT SERPL W/O P-5'-P-CCNC: 19 U/L
ANION GAP SERPL CALC-SCNC: 7 MMOL/L
AST SERPL-CCNC: 19 U/L
BASOPHILS # BLD AUTO: 0.1 K/UL
BASOPHILS NFR BLD: 1.2 %
BILIRUB SERPL-MCNC: 0.4 MG/DL
BUN SERPL-MCNC: 14 MG/DL
CALCIUM SERPL-MCNC: 9.7 MG/DL
CHLORIDE SERPL-SCNC: 104 MMOL/L
CHOLEST SERPL-MCNC: 187 MG/DL
CHOLEST/HDLC SERPL: 4.9 {RATIO}
CO2 SERPL-SCNC: 29 MMOL/L
CREAT SERPL-MCNC: 0.8 MG/DL
DIFFERENTIAL METHOD: ABNORMAL
EOSINOPHIL # BLD AUTO: 0.3 K/UL
EOSINOPHIL NFR BLD: 3.4 %
ERYTHROCYTE [DISTWIDTH] IN BLOOD BY AUTOMATED COUNT: 14 %
EST. GFR  (AFRICAN AMERICAN): >60 ML/MIN/1.73 M^2
EST. GFR  (NON AFRICAN AMERICAN): >60 ML/MIN/1.73 M^2
GLUCOSE SERPL-MCNC: 99 MG/DL
HCT VFR BLD AUTO: 43.4 %
HDLC SERPL-MCNC: 38 MG/DL
HDLC SERPL: 20.3 %
HGB BLD-MCNC: 13.6 G/DL
IMM GRANULOCYTES # BLD AUTO: 0.02 K/UL
IMM GRANULOCYTES NFR BLD AUTO: 0.2 %
LDLC SERPL CALC-MCNC: 121.8 MG/DL
LYMPHOCYTES # BLD AUTO: 2 K/UL
LYMPHOCYTES NFR BLD: 24.3 %
MCH RBC QN AUTO: 30 PG
MCHC RBC AUTO-ENTMCNC: 31.3 G/DL
MCV RBC AUTO: 96 FL
MONOCYTES # BLD AUTO: 0.6 K/UL
MONOCYTES NFR BLD: 6.8 %
NEUTROPHILS # BLD AUTO: 5.2 K/UL
NEUTROPHILS NFR BLD: 64.1 %
NONHDLC SERPL-MCNC: 149 MG/DL
NRBC BLD-RTO: 0 /100 WBC
PLATELET # BLD AUTO: 314 K/UL
PMV BLD AUTO: 10.6 FL
POTASSIUM SERPL-SCNC: 4.7 MMOL/L
PROT SERPL-MCNC: 7.1 G/DL
RBC # BLD AUTO: 4.53 M/UL
SODIUM SERPL-SCNC: 140 MMOL/L
TRIGL SERPL-MCNC: 136 MG/DL
WBC # BLD AUTO: 8.19 K/UL

## 2019-02-22 PROCEDURE — 85025 COMPLETE CBC W/AUTO DIFF WBC: CPT

## 2019-02-22 PROCEDURE — 99999 PR PBB SHADOW E&M-EST. PATIENT-LVL IV: CPT | Mod: PBBFAC,,, | Performed by: FAMILY MEDICINE

## 2019-02-22 PROCEDURE — 80053 COMPREHEN METABOLIC PANEL: CPT

## 2019-02-22 PROCEDURE — 99999 PR PBB SHADOW E&M-EST. PATIENT-LVL IV: ICD-10-PCS | Mod: PBBFAC,,, | Performed by: FAMILY MEDICINE

## 2019-02-22 PROCEDURE — 80061 LIPID PANEL: CPT

## 2019-02-22 PROCEDURE — 36415 COLL VENOUS BLD VENIPUNCTURE: CPT | Mod: PO

## 2019-02-22 PROCEDURE — 99396 PR PREVENTIVE VISIT,EST,40-64: ICD-10-PCS | Mod: S$GLB,,, | Performed by: FAMILY MEDICINE

## 2019-02-22 PROCEDURE — 99396 PREV VISIT EST AGE 40-64: CPT | Mod: S$GLB,,, | Performed by: FAMILY MEDICINE

## 2019-02-22 NOTE — PATIENT INSTRUCTIONS

## 2019-02-22 NOTE — PROGRESS NOTES
Subjective:       Patient ID: Amanda Clark is a 59 y.o. female.    Chief Complaint: Annual Exam    59-year-old female coming in for physical exam she is fasting for lab.  She has been having intermittent low back pain with occasional radiation to the anterolateral thighs bilaterally. Pain is relieved by sitting down and by raising the knees upwards.  It is aggravated by standing.  She has been seeing Dr. Ha who has offered epidural steroids or a nerve block but she does not feel that it is sufficiently bad to warrant that.  She can tolerate the pain at its current level and it is somewhat intermittent.  She is also followed by Dr. Lazo and has had a laminotomy for herniated disc in the past.  History includes hyperlipidemia which has been fairly well controlled on diet, anxiety and depression which is in remission, arthritis, psoriasis, and chronic back pain with a history of a herniated disc.  She is due for a Pap smear which will be done at the [place] office shortly after this appointment.    Past Medical History:  No date: Anxiety  No date: Arthritis  No date: Back pain  No date: Cancer      Comment:  precancerous skin lesion  No date: Depression  No date: Herniated disc      Comment:  L-4   No date: Hyperlipidemia  No date: Knee pain  No date: Psoriasis    Past Surgical History:  No date: ADENOIDECTOMY  No date: arthroscopic left knee  No date: BACK injections      Comment:   & 2013  No date:  SECTION  1/25/10: COLONOSCOPY      Comment:  Dr. Coronado, 10 year recheck  2016: JOINT REPLACEMENT      Comment:  right knee   2015: JOINT REPLACEMENT      Comment:  left knee   2015: LAMINOTOMY      Comment:  Dr Lazo  No date: STEROID INJECTION KNEE  No date: TONSILLECTOMY  No date: vocal cord nodule removal    Review of patient's family history indicates:  Problem: Heart disease      Relation: Mother          Age of Onset: (Not Specified)  Problem: Diabetes      Relation:  Mother          Age of Onset: (Not Specified)  Problem: Psoriasis      Relation: Mother          Age of Onset: (Not Specified)  Problem: Macular degeneration      Relation: Mother          Age of Onset: (Not Specified)  Problem: Stroke      Relation: Mother          Age of Onset: (Not Specified)  Problem: Hearing loss      Relation: Mother          Age of Onset: (Not Specified)  Problem: Arthritis      Relation: Mother          Age of Onset: (Not Specified)  Problem: Cancer      Relation: Father          Age of Onset: (Not Specified)          Comment: bone, prostate  Problem: Heart disease      Relation: Sister          Age of Onset: (Not Specified)  Problem: Diabetes      Relation: Sister          Age of Onset: (Not Specified)  Problem: Hypertension      Relation: Sister          Age of Onset: (Not Specified)  Problem: Depression      Relation: Sister          Age of Onset: (Not Specified)  Problem: Macular degeneration      Relation: Sister          Age of Onset: (Not Specified)  Problem: Heart disease      Relation: Maternal Aunt          Age of Onset: (Not Specified)  Problem: Dementia      Relation: Maternal Aunt          Age of Onset: (Not Specified)  Problem: Cancer      Relation: Paternal Uncle          Age of Onset: (Not Specified)          Comment: tumor abdominal  Problem: Heart disease      Relation: Paternal Uncle          Age of Onset: (Not Specified)  Problem: Dementia      Relation: Paternal Uncle          Age of Onset: (Not Specified)  Problem: No Known Problems      Relation: Daughter          Age of Onset: (Not Specified)  Problem: Melanoma      Relation: Neg Hx          Age of Onset: (Not Specified)  Problem: Lupus      Relation: Neg Hx          Age of Onset: (Not Specified)  Problem: Eczema      Relation: Neg Hx          Age of Onset: (Not Specified)    Social History    Tobacco Use      Smoking status: Former Smoker        Types: Cigarettes        Quit date: 2/27/1988        Years since  quittin.0      Smokeless tobacco: Never Used    Alcohol use: Yes      Comment: sometimes    Drug use: No    Current Outpatient Medications on File Prior to Visit:  busPIRone (BUSPAR) 10 MG tablet, Take 10 mg by mouth 3 (three) times daily as needed., Disp: , Rfl:   clobetasol (TEMOVATE) 0.05 % external solution, Use on scalp one - two times daily as needed for scaling or itching, Disp: 60 mL, Rfl: 3  fluoxetine (PROZAC) 40 MG capsule, TAKE ONE CAPSULE BY MOUTH ONCE DAILY (Patient taking differently: TAKE ONE CAPSULE BY MOUTH ONCE DAILY prn), Disp: 30 capsule, Rfl: 5  hydrocortisone 2.5 % cream, AAA face TWICE DAILY for 1-2 weeks PRN flares, Disp: 30 g, Rfl: 2  ketoconazole (NIZORAL) 2 % shampoo, Wash hair with medicated shampoo at least 2x/week - let sit on scalp at least 5 minutes prior to rinsing (Patient taking differently: daily as needed. Wash hair with medicated shampoo at least 2x/week - let sit on scalp at least 5 minutes prior to rinsing), Disp: 120 mL, Rfl: 5    No current facility-administered medications on file prior to visit.     Pap Smear with HPV Cotest due on 1980  Zoster Vaccine due on 2019        Review of Systems   Constitutional: Negative for chills, diaphoresis, fatigue, fever and unexpected weight change.   HENT: Negative for congestion, ear pain, hearing loss, postnasal drip, sinus pressure, sneezing, sore throat, tinnitus and trouble swallowing.    Eyes: Negative for itching and visual disturbance.   Respiratory: Negative for cough, chest tightness, shortness of breath and wheezing.    Cardiovascular: Negative for chest pain, palpitations and leg swelling.   Gastrointestinal: Negative for abdominal pain, blood in stool, constipation, diarrhea, nausea and vomiting.   Genitourinary: Negative for dysuria, frequency, hematuria, menstrual problem, pelvic pain, vaginal bleeding and vaginal discharge.   Musculoskeletal: Positive for back pain. Negative for arthralgias, joint  swelling and myalgias.   Neurological: Positive for numbness (She wakes up frequently with numbness and tingling in the hands predominantly on the right side). Negative for dizziness, weakness and headaches.   Hematological: Negative for adenopathy.   Psychiatric/Behavioral: Negative for sleep disturbance. The patient is not nervous/anxious.        Objective:      Physical Exam   Constitutional: She is oriented to person, place, and time. She appears well-developed. No distress.   Good blood pressure control  Morbidly obese with a BMI of 47.7 she is down 7.3 lb from her last visit with me July 11, 2018   HENT:   Head: Normocephalic and atraumatic.   Right Ear: External ear normal.   Left Ear: External ear normal.   Nose: Nose normal.   Mouth/Throat: Oropharynx is clear and moist. No oropharyngeal exudate.   Eyes: Conjunctivae and EOM are normal. Pupils are equal, round, and reactive to light. Right eye exhibits no discharge. Left eye exhibits no discharge. No scleral icterus.   Neck: Normal range of motion. Neck supple. No JVD present. No tracheal deviation present. No thyromegaly present.   Cardiovascular: Normal rate, regular rhythm and normal heart sounds. Exam reveals no gallop and no friction rub.   No murmur heard.  Carotid pulses 2+ without bruit   Pulmonary/Chest: Effort normal and breath sounds normal. No stridor. No respiratory distress. She has no wheezes. She has no rales. She exhibits no tenderness.   Abdominal: Soft. Bowel sounds are normal. She exhibits no distension and no mass. There is no tenderness. There is no rebound and no guarding.   Musculoskeletal: Normal range of motion. She exhibits no edema or tenderness.   Lymphadenopathy:     She has no cervical adenopathy.   Neurological: She is alert and oriented to person, place, and time. She has normal reflexes. She displays normal reflexes. No cranial nerve deficit or sensory deficit. She exhibits normal muscle tone.   Positive Tinel's and  Phalen's on the right hand indicating most likely her symptoms are due to carpal tunnel  Compression over the femoral triangle does not elicit any numbness or tingling in the anterior lateral thighs   Skin: Skin is warm and dry. No rash noted. She is not diaphoretic. No erythema.   Psychiatric: She has a normal mood and affect. Her behavior is normal. Judgment and thought content normal.   Nursing note and vitals reviewed.      Assessment:       1. Encounter for preventive health examination    2. Hyperlipidemia, unspecified hyperlipidemia type    3. Arthritis    4. Major depression in remission    5. Anxiety    6. Psoriasis    7. Spinal stenosis of lumbar region with neurogenic claudication    8. Bilateral carpal tunnel syndrome    9. Morbid obesity with BMI of 45.0-49.9, adult        Plan:       1. Encounter for preventive health examination  - Comprehensive metabolic panel; Future  - CBC auto differential; Future  - Lipid panel; Future    2. Hyperlipidemia, unspecified hyperlipidemia type  Await lab results well controlled up until nail  - Comprehensive metabolic panel; Future  - Lipid panel; Future    3. Arthritis  Minimally symptomatic    4. Major depression in remission  Not taking medications currently no changes needed    5. Anxiety  See above    6. Psoriasis  Asymptomatic    7. Spinal stenosis of lumbar region with neurogenic claudication  Mildly symptomatic, can tolerate it without medications.  Followed by pain management and Dr. Lazo    8. Bilateral carpal tunnel syndrome  Only having trouble at night, suggested she try her wrist splint that she already has at home    9. Morbid obesity with BMI of 45.0-49.9, adult  Continue weight loss, doing well.  She has been on and off of weight watchers  The patient's BMI has been recorded in the chart. The patient has been provided educational materials regarding the benefits of attaining and maintaining a normal weight. We will continue to address and follow  this issue during follow up visits.

## 2019-05-28 ENCOUNTER — DOCUMENTATION ONLY (OUTPATIENT)
Dept: FAMILY MEDICINE | Facility: CLINIC | Age: 59
End: 2019-05-28

## 2019-05-28 ENCOUNTER — TELEPHONE (OUTPATIENT)
Dept: FAMILY MEDICINE | Facility: CLINIC | Age: 59
End: 2019-05-28

## 2019-05-28 ENCOUNTER — PATIENT MESSAGE (OUTPATIENT)
Dept: FAMILY MEDICINE | Facility: CLINIC | Age: 59
End: 2019-05-28

## 2019-05-28 DIAGNOSIS — L40.8 SEBOPSORIASIS: ICD-10-CM

## 2019-05-28 DIAGNOSIS — G56.01 CARPAL TUNNEL SYNDROME OF RIGHT WRIST: ICD-10-CM

## 2019-05-28 RX ORDER — FLUOXETINE HYDROCHLORIDE 40 MG/1
40 CAPSULE ORAL DAILY
Qty: 30 CAPSULE | Refills: 5 | Status: SHIPPED | OUTPATIENT
Start: 2019-05-28 | End: 2021-04-05 | Stop reason: SDUPTHER

## 2019-05-28 RX ORDER — HYDROCORTISONE 25 MG/G
CREAM TOPICAL
Qty: 30 G | Refills: 2 | Status: SHIPPED | OUTPATIENT
Start: 2019-05-28 | End: 2021-01-19

## 2019-05-28 RX ORDER — FUROSEMIDE 40 MG/1
TABLET ORAL
Qty: 30 TABLET | Refills: 0 | OUTPATIENT
Start: 2019-05-28

## 2019-05-28 NOTE — PROGRESS NOTES
Pre-Visit Chart Review  For Appointment Scheduled on 5-31-19    Health Maintenance Due   Topic Date Due    Pap Smear with HPV Cotest  01/30/1980

## 2019-05-28 NOTE — TELEPHONE ENCOUNTER
Patient takes Prozac for depression. Has been off of it and feels that she needs it again. Is depressed and weepy-denies suicidal ideation. She sent refill request for Furosemide instead of Fluoxetine so deny the Lasix. [facility]

## 2019-05-31 ENCOUNTER — OFFICE VISIT (OUTPATIENT)
Dept: FAMILY MEDICINE | Facility: CLINIC | Age: 59
End: 2019-05-31
Attending: FAMILY MEDICINE
Payer: COMMERCIAL

## 2019-05-31 VITALS
HEART RATE: 86 BPM | DIASTOLIC BLOOD PRESSURE: 74 MMHG | WEIGHT: 264.13 LBS | SYSTOLIC BLOOD PRESSURE: 122 MMHG | HEIGHT: 62 IN | BODY MASS INDEX: 48.61 KG/M2 | TEMPERATURE: 98 F

## 2019-05-31 DIAGNOSIS — F33.0 MILD EPISODE OF RECURRENT MAJOR DEPRESSIVE DISORDER: Primary | ICD-10-CM

## 2019-05-31 PROCEDURE — 99213 OFFICE O/P EST LOW 20 MIN: CPT | Mod: S$GLB,,, | Performed by: FAMILY MEDICINE

## 2019-05-31 PROCEDURE — 3008F BODY MASS INDEX DOCD: CPT | Mod: CPTII,S$GLB,, | Performed by: FAMILY MEDICINE

## 2019-05-31 PROCEDURE — 99213 PR OFFICE/OUTPT VISIT, EST, LEVL III, 20-29 MIN: ICD-10-PCS | Mod: S$GLB,,, | Performed by: FAMILY MEDICINE

## 2019-05-31 PROCEDURE — 3008F PR BODY MASS INDEX (BMI) DOCUMENTED: ICD-10-PCS | Mod: CPTII,S$GLB,, | Performed by: FAMILY MEDICINE

## 2019-05-31 PROCEDURE — 99999 PR PBB SHADOW E&M-EST. PATIENT-LVL III: ICD-10-PCS | Mod: PBBFAC,,, | Performed by: FAMILY MEDICINE

## 2019-05-31 PROCEDURE — 99999 PR PBB SHADOW E&M-EST. PATIENT-LVL III: CPT | Mod: PBBFAC,,, | Performed by: FAMILY MEDICINE

## 2019-05-31 NOTE — PROGRESS NOTES
Subjective:       Patient ID: Amanda Clark is a 59 y.o. female.    Chief Complaint: Depression    59-year-old female comes in for recurrent episode of depression.  She gets these periodically in usually a few months on some Prozac will give her good relief.  There to generally triggered by stress at work and she gets somewhat tearful, a little disorganized and decreased productivity but with no sleep disruption or appetite disruption.  Historically she has tried several antidepressants and the Prozac works the best.  She started on the Prozac four days ago and feels she is already getting some improvement    Past Medical History:  No date: Anxiety  No date: Arthritis  No date: Back pain  No date: Cancer      Comment:  precancerous skin lesion  No date: Depression  No date: Herniated disc      Comment:  L-4   No date: Hyperlipidemia  No date: Knee pain  No date: Psoriasis    Past Surgical History:  No date: ADENOIDECTOMY  No date: arthroscopic left knee  No date: BACK injections      Comment:   & 2013  No date:  SECTION  1/25/10: COLONOSCOPY      Comment:  Dr. Coronado, 10 year recheck  2016: JOINT REPLACEMENT      Comment:  right knee   2015: JOINT REPLACEMENT      Comment:  left knee   2015: LAMINOTOMY      Comment:  Dr Lazo  No date: STEROID INJECTION KNEE  No date: TONSILLECTOMY  No date: vocal cord nodule removal    Current Outpatient Medications on File Prior to Visit:  busPIRone (BUSPAR) 10 MG tablet, Take 10 mg by mouth 3 (three) times daily as needed., Disp: , Rfl:   clobetasol (TEMOVATE) 0.05 % external solution, Use on scalp one - two times daily as needed for scaling or itching, Disp: 60 mL, Rfl: 3  FLUoxetine 40 MG capsule, Take 1 capsule (40 mg total) by mouth once daily., Disp: 30 capsule, Rfl: 5  hydrocortisone 2.5 % cream, APPLY TO FACE TWICE A DAY FOR 1-2 WEEKS AS NEEDED FOR FLARES, Disp: 30 g, Rfl: 2  ketoconazole (NIZORAL) 2 % shampoo, Wash hair with  medicated shampoo at least 2x/week - let sit on scalp at least 5 minutes prior to rinsing (Patient taking differently: daily as needed. Wash hair with medicated shampoo at least 2x/week - let sit on scalp at least 5 minutes prior to rinsing), Disp: 120 mL, Rfl: 5    No current facility-administered medications on file prior to visit.         Review of Systems   Constitutional: Negative for activity change and unexpected weight change.   HENT: Negative for hearing loss, rhinorrhea and trouble swallowing.    Eyes: Negative for discharge and visual disturbance.   Respiratory: Negative for chest tightness and wheezing.    Cardiovascular: Negative for chest pain and palpitations.   Gastrointestinal: Negative for blood in stool, constipation, diarrhea and vomiting.   Endocrine: Negative for polydipsia and polyuria.   Genitourinary: Negative for difficulty urinating, dysuria, hematuria and menstrual problem.   Musculoskeletal: Negative for arthralgias, joint swelling and neck pain.   Neurological: Negative for weakness and headaches.   Psychiatric/Behavioral: Positive for dysphoric mood. Negative for confusion.       Objective:      Physical Exam   Constitutional: She appears well-developed. No distress.   Good blood pressure control  Morbid obesity with a BMI of 48.3 she is up 3.1 lb from her February 22, 2019 visit   Skin: She is not diaphoretic.   Psychiatric: Her speech is normal and behavior is normal. Judgment and thought content normal. Her mood appears not anxious. Her affect is not labile and not inappropriate. She is not actively hallucinating. Cognition and memory are normal. She does not exhibit a depressed mood. She is attentive.   Nursing note and vitals reviewed.      Assessment:       1. Mild episode of recurrent major depressive disorder        Plan:       1. Mild episode of recurrent major depressive disorder  Continue Prozac

## 2019-07-18 ENCOUNTER — OFFICE VISIT (OUTPATIENT)
Dept: FAMILY MEDICINE | Facility: CLINIC | Age: 59
End: 2019-07-18
Attending: FAMILY MEDICINE
Payer: COMMERCIAL

## 2019-07-18 ENCOUNTER — DOCUMENTATION ONLY (OUTPATIENT)
Dept: FAMILY MEDICINE | Facility: CLINIC | Age: 59
End: 2019-07-18

## 2019-07-18 VITALS
RESPIRATION RATE: 20 BRPM | TEMPERATURE: 98 F | HEART RATE: 70 BPM | SYSTOLIC BLOOD PRESSURE: 136 MMHG | BODY MASS INDEX: 48.36 KG/M2 | HEIGHT: 62 IN | DIASTOLIC BLOOD PRESSURE: 78 MMHG | OXYGEN SATURATION: 96 % | WEIGHT: 262.81 LBS

## 2019-07-18 DIAGNOSIS — H81.10 BENIGN PAROXYSMAL POSITIONAL VERTIGO, UNSPECIFIED LATERALITY: Primary | ICD-10-CM

## 2019-07-18 PROCEDURE — 3008F BODY MASS INDEX DOCD: CPT | Mod: CPTII,S$GLB,, | Performed by: FAMILY MEDICINE

## 2019-07-18 PROCEDURE — 99213 OFFICE O/P EST LOW 20 MIN: CPT | Mod: S$GLB,,, | Performed by: FAMILY MEDICINE

## 2019-07-18 PROCEDURE — 3008F PR BODY MASS INDEX (BMI) DOCUMENTED: ICD-10-PCS | Mod: CPTII,S$GLB,, | Performed by: FAMILY MEDICINE

## 2019-07-18 PROCEDURE — 99999 PR PBB SHADOW E&M-EST. PATIENT-LVL IV: CPT | Mod: PBBFAC,,, | Performed by: FAMILY MEDICINE

## 2019-07-18 PROCEDURE — 99999 PR PBB SHADOW E&M-EST. PATIENT-LVL IV: ICD-10-PCS | Mod: PBBFAC,,, | Performed by: FAMILY MEDICINE

## 2019-07-18 PROCEDURE — 99213 PR OFFICE/OUTPT VISIT, EST, LEVL III, 20-29 MIN: ICD-10-PCS | Mod: S$GLB,,, | Performed by: FAMILY MEDICINE

## 2019-07-18 NOTE — PROGRESS NOTES
Pre-Visit Chart Review  For Appointment Scheduled on 7-18-19    Health Maintenance Due   Topic Date Due    Pap Smear with HPV Cotest  01/30/1980

## 2019-07-18 NOTE — PROGRESS NOTES
Subjective:       Patient ID: Amanda Clark is a 59 y.o. female.    Chief Complaint: Dizziness    59-year-old female got up to go to the bathroom in the middle the night several days ago in on returning to bed when she laid down she had brief episode of somewhat violent vertigo.  She has been having recurrences of it when she gets up in the morning and frequently will trigger it when looking up and down.  She has had no fever chills or congestion. She has no ear pain.  She has had no nausea or vomiting.    Past Medical History:  No date: Anxiety  No date: Arthritis  No date: Back pain  No date: Cancer      Comment:  precancerous skin lesion  No date: Depression  2019: Ganglion cyst of wrist, left      Comment:  Dr Alves   No date: Herniated disc      Comment:  L-4   No date: Hyperlipidemia  No date: Knee pain  No date: Psoriasis    Past Surgical History:  No date: ADENOIDECTOMY  No date: arthroscopic left knee  No date: BACK injections      Comment:   & 2013  No date:  SECTION  1/25/10: COLONOSCOPY      Comment:  Dr. Coronado, 10 year recheck  2016: JOINT REPLACEMENT      Comment:  right knee   2015: JOINT REPLACEMENT      Comment:  left knee   2015: LAMINOTOMY      Comment:  Dr Lazo  No date: STEROID INJECTION KNEE  No date: TONSILLECTOMY  No date: vocal cord nodule removal    Current Outpatient Medications on File Prior to Visit:  clobetasol (TEMOVATE) 0.05 % external solution, Use on scalp one - two times daily as needed for scaling or itching, Disp: 60 mL, Rfl: 3  FLUoxetine 40 MG capsule, Take 1 capsule (40 mg total) by mouth once daily., Disp: 30 capsule, Rfl: 5  hydrocortisone 2.5 % cream, APPLY TO FACE TWICE A DAY FOR 1-2 WEEKS AS NEEDED FOR FLARES, Disp: 30 g, Rfl: 2  ketoconazole (NIZORAL) 2 % shampoo, Wash hair with medicated shampoo at least 2x/week - let sit on scalp at least 5 minutes prior to rinsing (Patient taking differently: daily as needed. Wash hair  with medicated shampoo at least 2x/week - let sit on scalp at least 5 minutes prior to rinsing), Disp: 120 mL, Rfl: 5  (DISCONTINUED) busPIRone (BUSPAR) 10 MG tablet, Take 10 mg by mouth 3 (three) times daily as needed., Disp: , Rfl:     No current facility-administered medications on file prior to visit.         Review of Systems   Constitutional: Negative for activity change and unexpected weight change.   HENT: Negative for hearing loss, rhinorrhea and trouble swallowing.    Eyes: Negative for discharge and visual disturbance.   Respiratory: Negative for chest tightness and wheezing.    Cardiovascular: Negative for chest pain and palpitations.   Gastrointestinal: Negative for blood in stool, constipation, diarrhea and vomiting.   Endocrine: Negative for polydipsia and polyuria.   Genitourinary: Negative for difficulty urinating, dysuria, hematuria and menstrual problem.   Musculoskeletal: Negative for arthralgias, joint swelling and neck pain.   Neurological: Negative for weakness and headaches.   Psychiatric/Behavioral: Negative for confusion and dysphoric mood.       Objective:      Physical Exam   Constitutional: She appears well-developed. No distress.   Good blood pressure control with no evidence of orthostatic blood pressure drop   HENT:   Head: Normocephalic and atraumatic.   Right Ear: External ear normal.   Left Ear: External ear normal.   Nose: Nose normal.   Mouth/Throat: Oropharynx is clear and moist. No oropharyngeal exudate.   Slight geographic tongue, mild nasal turbinate erythema without any significant swelling or exudate   Eyes: Pupils are equal, round, and reactive to light. EOM are normal. No scleral icterus.   Neck: Normal range of motion. Neck supple. No JVD present. No tracheal deviation present. No thyromegaly present.   Cardiovascular: Normal rate, regular rhythm and normal heart sounds. Exam reveals no gallop and no friction rub.   No murmur heard.  Pulmonary/Chest: Effort normal and  breath sounds normal. No stridor. No respiratory distress. She has no wheezes. She has no rales. She exhibits no tenderness.   Lymphadenopathy:     She has no cervical adenopathy.   Skin: She is not diaphoretic.   Nursing note and vitals reviewed.      Assessment:       1. Benign paroxysmal positional vertigo, unspecified laterality        Plan:       1. Benign paroxysmal positional vertigo, unspecified laterality  Given instructions for BPPV maneuvers and watched video  Offered meclizine but she declined

## 2019-07-18 NOTE — PATIENT INSTRUCTIONS

## 2019-07-19 ENCOUNTER — PATIENT OUTREACH (OUTPATIENT)
Dept: ADMINISTRATIVE | Facility: HOSPITAL | Age: 59
End: 2019-07-19

## 2019-08-16 ENCOUNTER — PATIENT MESSAGE (OUTPATIENT)
Dept: FAMILY MEDICINE | Facility: CLINIC | Age: 59
End: 2019-08-16

## 2019-08-16 DIAGNOSIS — G47.33 OSA (OBSTRUCTIVE SLEEP APNEA): Primary | ICD-10-CM

## 2019-08-16 RX ORDER — MECLIZINE HYDROCHLORIDE 25 MG/1
25 TABLET ORAL 3 TIMES DAILY PRN
Qty: 40 TABLET | Refills: 2 | Status: SHIPPED | OUTPATIENT
Start: 2019-08-16 | End: 2021-01-19

## 2019-08-16 NOTE — TELEPHONE ENCOUNTER
Meclizine 25 mg up to 3 times a day p.r.n. dizziness sent to [facility].    We can set up a sleep study referral without an office visit.  The main concerns are    1.  Does she stopped breathing when she sleeps, has anyone ever witnessed this?    2.  Does she wake up choking or gasping?    3.  Does she have difficulty staying awake during the day, particularly with some activities such as driving?    4.  Does she have excessive fatigue question    She will have to have an updated thyroid level before she could do a sleep study.

## 2019-08-21 ENCOUNTER — PATIENT MESSAGE (OUTPATIENT)
Dept: FAMILY MEDICINE | Facility: CLINIC | Age: 59
End: 2019-08-21

## 2019-08-22 ENCOUNTER — LAB VISIT (OUTPATIENT)
Dept: LAB | Facility: HOSPITAL | Age: 59
End: 2019-08-22
Attending: FAMILY MEDICINE
Payer: COMMERCIAL

## 2019-08-22 DIAGNOSIS — G47.33 OSA (OBSTRUCTIVE SLEEP APNEA): ICD-10-CM

## 2019-08-22 LAB — TSH SERPL DL<=0.005 MIU/L-ACNC: 1.73 UIU/ML (ref 0.4–4)

## 2019-08-22 PROCEDURE — 36415 COLL VENOUS BLD VENIPUNCTURE: CPT | Mod: PO

## 2019-08-22 PROCEDURE — 84443 ASSAY THYROID STIM HORMONE: CPT

## 2019-11-05 DIAGNOSIS — E66.01 MORBID OBESITY WITH BMI OF 45.0-49.9, ADULT: ICD-10-CM

## 2019-11-05 DIAGNOSIS — R06.83 SNORING: ICD-10-CM

## 2019-11-05 DIAGNOSIS — G47.19 EXCESSIVE DAYTIME SLEEPINESS: ICD-10-CM

## 2019-11-05 DIAGNOSIS — R53.83 FATIGUE DUE TO SLEEP PATTERN DISTURBANCE: ICD-10-CM

## 2019-11-05 DIAGNOSIS — G47.33 OSA (OBSTRUCTIVE SLEEP APNEA): Primary | ICD-10-CM

## 2019-11-05 DIAGNOSIS — G47.9 FATIGUE DUE TO SLEEP PATTERN DISTURBANCE: ICD-10-CM

## 2020-01-06 ENCOUNTER — PROCEDURE VISIT (OUTPATIENT)
Dept: SLEEP MEDICINE | Facility: HOSPITAL | Age: 60
End: 2020-01-06
Attending: INTERNAL MEDICINE
Payer: COMMERCIAL

## 2020-01-06 DIAGNOSIS — G47.33 OSA (OBSTRUCTIVE SLEEP APNEA): ICD-10-CM

## 2020-01-06 DIAGNOSIS — E66.01 MORBID OBESITY WITH BMI OF 45.0-49.9, ADULT: ICD-10-CM

## 2020-01-06 DIAGNOSIS — R06.83 SNORING: ICD-10-CM

## 2020-01-06 DIAGNOSIS — R53.83 FATIGUE DUE TO SLEEP PATTERN DISTURBANCE: ICD-10-CM

## 2020-01-06 DIAGNOSIS — G47.9 FATIGUE DUE TO SLEEP PATTERN DISTURBANCE: ICD-10-CM

## 2020-01-06 DIAGNOSIS — G47.19 EXCESSIVE DAYTIME SLEEPINESS: ICD-10-CM

## 2020-01-06 PROCEDURE — 95806 SLEEP STUDY UNATT&RESP EFFT: CPT

## 2020-01-23 DIAGNOSIS — G47.33 OSA (OBSTRUCTIVE SLEEP APNEA): Primary | ICD-10-CM

## 2020-01-23 DIAGNOSIS — R53.83 FATIGUE DUE TO SLEEP PATTERN DISTURBANCE: ICD-10-CM

## 2020-01-23 DIAGNOSIS — R06.83 SNORING: ICD-10-CM

## 2020-01-23 DIAGNOSIS — G47.19 EXCESSIVE DAYTIME SLEEPINESS: ICD-10-CM

## 2020-01-23 DIAGNOSIS — G47.9 FATIGUE DUE TO SLEEP PATTERN DISTURBANCE: ICD-10-CM

## 2020-02-11 ENCOUNTER — PROCEDURE VISIT (OUTPATIENT)
Dept: SLEEP MEDICINE | Facility: HOSPITAL | Age: 60
End: 2020-02-11
Attending: INTERNAL MEDICINE
Payer: COMMERCIAL

## 2020-02-11 DIAGNOSIS — R53.83 FATIGUE DUE TO SLEEP PATTERN DISTURBANCE: ICD-10-CM

## 2020-02-11 DIAGNOSIS — G47.9 FATIGUE DUE TO SLEEP PATTERN DISTURBANCE: ICD-10-CM

## 2020-02-11 DIAGNOSIS — R06.83 SNORING: ICD-10-CM

## 2020-02-11 DIAGNOSIS — G47.19 EXCESSIVE DAYTIME SLEEPINESS: ICD-10-CM

## 2020-02-11 DIAGNOSIS — G47.33 OSA (OBSTRUCTIVE SLEEP APNEA): ICD-10-CM

## 2020-02-11 PROCEDURE — 95811 POLYSOM 6/>YRS CPAP 4/> PARM: CPT

## 2020-07-29 ENCOUNTER — OFFICE VISIT (OUTPATIENT)
Dept: FAMILY MEDICINE | Facility: CLINIC | Age: 60
End: 2020-07-29
Payer: COMMERCIAL

## 2020-07-29 VITALS
BODY MASS INDEX: 51.28 KG/M2 | HEIGHT: 62 IN | WEIGHT: 278.69 LBS | HEART RATE: 72 BPM | TEMPERATURE: 98 F | DIASTOLIC BLOOD PRESSURE: 72 MMHG | SYSTOLIC BLOOD PRESSURE: 144 MMHG

## 2020-07-29 DIAGNOSIS — G56.01 CARPAL TUNNEL SYNDROME OF RIGHT WRIST: Primary | ICD-10-CM

## 2020-07-29 PROCEDURE — 99999 PR PBB SHADOW E&M-EST. PATIENT-LVL IV: ICD-10-PCS | Mod: PBBFAC,,, | Performed by: NURSE PRACTITIONER

## 2020-07-29 PROCEDURE — 99214 PR OFFICE/OUTPT VISIT, EST, LEVL IV, 30-39 MIN: ICD-10-PCS | Mod: S$GLB,,, | Performed by: NURSE PRACTITIONER

## 2020-07-29 PROCEDURE — 99214 OFFICE O/P EST MOD 30 MIN: CPT | Mod: S$GLB,,, | Performed by: NURSE PRACTITIONER

## 2020-07-29 PROCEDURE — 3008F BODY MASS INDEX DOCD: CPT | Mod: CPTII,S$GLB,, | Performed by: NURSE PRACTITIONER

## 2020-07-29 PROCEDURE — 99999 PR PBB SHADOW E&M-EST. PATIENT-LVL IV: CPT | Mod: PBBFAC,,, | Performed by: NURSE PRACTITIONER

## 2020-07-29 PROCEDURE — 3008F PR BODY MASS INDEX (BMI) DOCUMENTED: ICD-10-PCS | Mod: CPTII,S$GLB,, | Performed by: NURSE PRACTITIONER

## 2020-07-29 NOTE — PROGRESS NOTES
Patient ID: Amanda Clark is a 60 y.o. female.    Chief Complaint: Pre-op Exam      Amanda Clark is in the office CARPAL TUNNEL ON THE RIGHT. RIGHT ENDOSCOPIC CARPAL TUNNEL RELEASE. JESSI FARFAN 8/14/2020.    S/S for more than a year. Notes brace and ibuprofen use. Failed conservative therapies. EMG positive for carpal tunnel per patient report.       Past Medical History:   Diagnosis Date    Anxiety     Arthritis     Back pain     Cancer     precancerous skin lesion    Depression     Ganglion cyst of wrist, left 07/08/2019    Dr Farfan     Herniated disc     L-4     Hyperlipidemia     Knee pain     Psoriasis        STOP BANG Questionnaire  BMI (Calculated): 51        Current Outpatient Medications:     clobetasol (TEMOVATE) 0.05 % external solution, Use on scalp one - two times daily as needed for scaling or itching, Disp: 60 mL, Rfl: 3    FLUoxetine 40 MG capsule, Take 1 capsule (40 mg total) by mouth once daily., Disp: 30 capsule, Rfl: 5    hydrocortisone 2.5 % cream, APPLY TO FACE TWICE A DAY FOR 1-2 WEEKS AS NEEDED FOR FLARES, Disp: 30 g, Rfl: 2    ketoconazole (NIZORAL) 2 % shampoo, Wash hair with medicated shampoo at least 2x/week - let sit on scalp at least 5 minutes prior to rinsing (Patient taking differently: daily as needed. Wash hair with medicated shampoo at least 2x/week - let sit on scalp at least 5 minutes prior to rinsing), Disp: 120 mL, Rfl: 5    meclizine (ANTIVERT) 25 mg tablet, Take 1 tablet (25 mg total) by mouth 3 (three) times daily as needed for Dizziness., Disp: 40 tablet, Rfl: 2    The 10-year ASCVD risk score (Galtmalaika VERMA Jr., et al., 2013) is: 5.5%    Values used to calculate the score:      Age: 60 years      Sex: Female      Is Non- : No      Diabetic: No      Tobacco smoker: No      Systolic Blood Pressure: 144 mmHg      Is BP treated: No      HDL Cholesterol: 38 mg/dL      Total Cholesterol: 187 mg/dL     Wt Readings from Last 3  Encounters:   07/29/20 126.4 kg (278 lb 10.6 oz)   07/18/19 119.2 kg (262 lb 12.6 oz)   05/31/19 119.8 kg (264 lb 1.8 oz)     Temp Readings from Last 3 Encounters:   07/29/20 98.2 °F (36.8 °C) (Temporal)   07/18/19 98.2 °F (36.8 °C) (Oral)   05/31/19 98.4 °F (36.9 °C) (Oral)     BP Readings from Last 3 Encounters:   07/29/20 (!) 144/72   07/18/19 136/78   05/31/19 122/74     Pulse Readings from Last 3 Encounters:   07/29/20 72   07/18/19 70   05/31/19 86     Resp Readings from Last 3 Encounters:   07/18/19 20   02/22/19 16   07/11/18 17     PF Readings from Last 3 Encounters:   05/14/13 210 L/min     SpO2 Readings from Last 3 Encounters:   07/18/19 96%   07/11/18 97%   02/21/18 96%        Lab Results   Component Value Date    HGBA1C 6.1 10/21/2016    HGBA1C 6.0 11/01/2010    HGBA1C 6.1 07/20/2009     Lab Results   Component Value Date    LDLCALC 121.8 02/22/2019    CREATININE 0.8 02/22/2019       Review of Systems   Constitutional: Negative.    HENT: Negative.    Eyes: Negative.    Respiratory: Negative.    Cardiovascular: Negative.    Gastrointestinal: Negative.    Endocrine: Negative.    Genitourinary: Negative.    Musculoskeletal: Positive for back pain. Negative for neck pain and neck stiffness.   Allergic/Immunologic: Negative.    Neurological: Negative.         Sciatica right   Hematological: Negative.    Psychiatric/Behavioral: Negative.            Objective:      Physical Exam  Constitutional:       Appearance: Normal appearance.   Eyes:      Pupils: Pupils are equal, round, and reactive to light.   Neck:      Musculoskeletal: Normal range of motion and neck supple.   Cardiovascular:      Rate and Rhythm: Tachycardia present. Rhythm irregular.      Pulses: Normal pulses.      Heart sounds: Normal heart sounds.   Pulmonary:      Effort: Pulmonary effort is normal.      Breath sounds: Normal breath sounds.   Abdominal:      General: Abdomen is flat. Bowel sounds are normal.      Palpations: Abdomen is soft.    Musculoskeletal: Normal range of motion.   Skin:     General: Skin is warm and dry.      Capillary Refill: Capillary refill takes less than 2 seconds.   Neurological:      General: No focal deficit present.      Mental Status: She is alert.      Sensory: Sensory deficit (right mf. positive durkans) present.   Psychiatric:         Mood and Affect: Mood normal.         Behavior: Behavior normal.             Screening recommendations appropriate to age and health status were reviewed.    Carpal tunnel syndrome of right wrist        RCRI risk factors include: no known RCRI risk factors. As such, per RCRI the risk of cardiac death, nonfatal myocardial infarction, or nonfatal cardiac arrest is 0.4% and the risk of myocardial infarction, pulmonary edema, ventricular fibrillation, primary cardiac arrest, or complete heart block is 0.5%.  Overall this patient can be considered low risk for this low risk procedure. No further cardiac testing is recommended at this time.     Patient denies any symptoms (as per HPI) concerning for undiagnosed lung disease. Would not recommend obtaining chest X-ray or PFTs at this time. Patient quit smoking many years ago. We discussed the benefits of early mobilization and deep breathing after surgery.  She does have sleep apnea monitor post surgically.     Screened patient for alcohol misuse, use of illicit drugs, and personal or family history of anesthetic complications or bleeding diathesis and no substantial concerns were identified. None     All current medications were reviewed and at this time no changes to medications are recommended prior to surgery. Hold all meds AM of surgery.     I recommend use of standard pre-op and post-op precautions for this patient. In my opinion, she is medically optimized for this procedure, and can proceed without further evaluation.

## 2021-01-05 ENCOUNTER — PATIENT MESSAGE (OUTPATIENT)
Dept: FAMILY MEDICINE | Facility: CLINIC | Age: 61
End: 2021-01-05

## 2021-01-13 ENCOUNTER — PATIENT MESSAGE (OUTPATIENT)
Dept: FAMILY MEDICINE | Facility: CLINIC | Age: 61
End: 2021-01-13

## 2021-01-13 ENCOUNTER — TELEPHONE (OUTPATIENT)
Dept: FAMILY MEDICINE | Facility: CLINIC | Age: 61
End: 2021-01-13

## 2021-01-14 DIAGNOSIS — Z12.31 OTHER SCREENING MAMMOGRAM: ICD-10-CM

## 2021-01-19 ENCOUNTER — OFFICE VISIT (OUTPATIENT)
Dept: FAMILY MEDICINE | Facility: CLINIC | Age: 61
End: 2021-01-19
Attending: FAMILY MEDICINE

## 2021-01-19 VITALS
OXYGEN SATURATION: 97 % | DIASTOLIC BLOOD PRESSURE: 66 MMHG | SYSTOLIC BLOOD PRESSURE: 124 MMHG | HEART RATE: 82 BPM | BODY MASS INDEX: 50.68 KG/M2 | TEMPERATURE: 98 F | HEIGHT: 62 IN | WEIGHT: 275.38 LBS

## 2021-01-19 DIAGNOSIS — U07.1 COVID-19 VIRUS INFECTION: Primary | ICD-10-CM

## 2021-01-19 PROCEDURE — 99999 PR PBB SHADOW E&M-EST. PATIENT-LVL III: ICD-10-PCS | Mod: PBBFAC,,, | Performed by: FAMILY MEDICINE

## 2021-01-19 PROCEDURE — 99212 OFFICE O/P EST SF 10 MIN: CPT | Mod: S$GLB,,, | Performed by: FAMILY MEDICINE

## 2021-01-19 PROCEDURE — 99212 PR OFFICE/OUTPT VISIT, EST, LEVL II, 10-19 MIN: ICD-10-PCS | Mod: S$GLB,,, | Performed by: FAMILY MEDICINE

## 2021-01-19 PROCEDURE — 99999 PR PBB SHADOW E&M-EST. PATIENT-LVL III: CPT | Mod: PBBFAC,,, | Performed by: FAMILY MEDICINE

## 2021-03-23 ENCOUNTER — PATIENT OUTREACH (OUTPATIENT)
Dept: ADMINISTRATIVE | Facility: HOSPITAL | Age: 61
End: 2021-03-23

## 2021-04-05 ENCOUNTER — PATIENT MESSAGE (OUTPATIENT)
Dept: ADMINISTRATIVE | Facility: HOSPITAL | Age: 61
End: 2021-04-05

## 2021-04-14 ENCOUNTER — PATIENT OUTREACH (OUTPATIENT)
Dept: ADMINISTRATIVE | Facility: HOSPITAL | Age: 61
End: 2021-04-14

## 2021-04-20 ENCOUNTER — PATIENT OUTREACH (OUTPATIENT)
Dept: ADMINISTRATIVE | Facility: HOSPITAL | Age: 61
End: 2021-04-20

## 2021-04-20 ENCOUNTER — PATIENT MESSAGE (OUTPATIENT)
Dept: ADMINISTRATIVE | Facility: HOSPITAL | Age: 61
End: 2021-04-20

## 2021-05-04 ENCOUNTER — HOSPITAL ENCOUNTER (OUTPATIENT)
Dept: RADIOLOGY | Facility: HOSPITAL | Age: 61
Discharge: HOME | End: 2021-05-04
Attending: FAMILY MEDICINE
Payer: COMMERCIAL

## 2021-05-04 ENCOUNTER — OFFICE VISIT (OUTPATIENT)
Dept: FAMILY MEDICINE | Facility: CLINIC | Age: 61
End: 2021-05-04
Attending: FAMILY MEDICINE
Payer: COMMERCIAL

## 2021-05-04 ENCOUNTER — LAB VISIT (OUTPATIENT)
Dept: LAB | Facility: HOSPITAL | Age: 61
End: 2021-05-04
Attending: FAMILY MEDICINE
Payer: COMMERCIAL

## 2021-05-04 VITALS
BODY MASS INDEX: 48.9 KG/M2 | HEIGHT: 63 IN | SYSTOLIC BLOOD PRESSURE: 124 MMHG | DIASTOLIC BLOOD PRESSURE: 72 MMHG | OXYGEN SATURATION: 97 % | HEART RATE: 71 BPM | TEMPERATURE: 99 F | WEIGHT: 276 LBS

## 2021-05-04 DIAGNOSIS — G25.81 RESTLESS LEG SYNDROME: ICD-10-CM

## 2021-05-04 DIAGNOSIS — F41.9 ANXIETY: ICD-10-CM

## 2021-05-04 DIAGNOSIS — F32.5 MAJOR DEPRESSION IN REMISSION: ICD-10-CM

## 2021-05-04 DIAGNOSIS — E78.5 HYPERLIPIDEMIA, UNSPECIFIED HYPERLIPIDEMIA TYPE: ICD-10-CM

## 2021-05-04 DIAGNOSIS — M19.90 ARTHRITIS: ICD-10-CM

## 2021-05-04 DIAGNOSIS — Z12.31 OTHER SCREENING MAMMOGRAM: ICD-10-CM

## 2021-05-04 DIAGNOSIS — Z00.00 ENCOUNTER FOR PREVENTIVE HEALTH EXAMINATION: ICD-10-CM

## 2021-05-04 DIAGNOSIS — Z78.0 MENOPAUSE: Primary | ICD-10-CM

## 2021-05-04 DIAGNOSIS — Z12.11 COLON CANCER SCREENING: ICD-10-CM

## 2021-05-04 DIAGNOSIS — E66.01 MORBID OBESITY WITH BMI OF 45.0-49.9, ADULT: ICD-10-CM

## 2021-05-04 DIAGNOSIS — Z00.00 ENCOUNTER FOR PREVENTIVE HEALTH EXAMINATION: Primary | ICD-10-CM

## 2021-05-04 LAB
ALBUMIN SERPL BCP-MCNC: 3.7 G/DL (ref 3.5–5.2)
ALP SERPL-CCNC: 74 U/L (ref 55–135)
ALT SERPL W/O P-5'-P-CCNC: 21 U/L (ref 10–44)
ANION GAP SERPL CALC-SCNC: 8 MMOL/L (ref 8–16)
AST SERPL-CCNC: 21 U/L (ref 10–40)
BASOPHILS # BLD AUTO: 0.09 K/UL (ref 0–0.2)
BASOPHILS NFR BLD: 1.2 % (ref 0–1.9)
BILIRUB SERPL-MCNC: 0.7 MG/DL (ref 0.1–1)
BUN SERPL-MCNC: 13 MG/DL (ref 8–23)
CALCIUM SERPL-MCNC: 9 MG/DL (ref 8.7–10.5)
CHLORIDE SERPL-SCNC: 106 MMOL/L (ref 95–110)
CHOLEST SERPL-MCNC: 177 MG/DL (ref 120–199)
CHOLEST/HDLC SERPL: 4.2 {RATIO} (ref 2–5)
CO2 SERPL-SCNC: 27 MMOL/L (ref 23–29)
CREAT SERPL-MCNC: 0.7 MG/DL (ref 0.5–1.4)
DIFFERENTIAL METHOD: ABNORMAL
EOSINOPHIL # BLD AUTO: 0.2 K/UL (ref 0–0.5)
EOSINOPHIL NFR BLD: 2 % (ref 0–8)
ERYTHROCYTE [DISTWIDTH] IN BLOOD BY AUTOMATED COUNT: 15.5 % (ref 11.5–14.5)
EST. GFR  (AFRICAN AMERICAN): >60 ML/MIN/1.73 M^2
EST. GFR  (NON AFRICAN AMERICAN): >60 ML/MIN/1.73 M^2
GLUCOSE SERPL-MCNC: 110 MG/DL (ref 70–110)
HCT VFR BLD AUTO: 37.1 % (ref 37–48.5)
HDLC SERPL-MCNC: 42 MG/DL (ref 40–75)
HDLC SERPL: 23.7 % (ref 20–50)
HGB BLD-MCNC: 11.7 G/DL (ref 12–16)
IMM GRANULOCYTES # BLD AUTO: 0.03 K/UL (ref 0–0.04)
IMM GRANULOCYTES NFR BLD AUTO: 0.4 % (ref 0–0.5)
LDLC SERPL CALC-MCNC: 117 MG/DL (ref 63–159)
LYMPHOCYTES # BLD AUTO: 1.8 K/UL (ref 1–4.8)
LYMPHOCYTES NFR BLD: 23.7 % (ref 18–48)
MAGNESIUM SERPL-MCNC: 2.1 MG/DL (ref 1.6–2.6)
MCH RBC QN AUTO: 28.8 PG (ref 27–31)
MCHC RBC AUTO-ENTMCNC: 31.5 G/DL (ref 32–36)
MCV RBC AUTO: 91 FL (ref 82–98)
MONOCYTES # BLD AUTO: 0.5 K/UL (ref 0.3–1)
MONOCYTES NFR BLD: 6.9 % (ref 4–15)
NEUTROPHILS # BLD AUTO: 4.9 K/UL (ref 1.8–7.7)
NEUTROPHILS NFR BLD: 65.8 % (ref 38–73)
NONHDLC SERPL-MCNC: 135 MG/DL
NRBC BLD-RTO: 0 /100 WBC
PAP RECOMMENDATION EXT: NORMAL
PAP SMEAR: NORMAL
PLATELET # BLD AUTO: 332 K/UL (ref 150–450)
PMV BLD AUTO: 10.4 FL (ref 9.2–12.9)
POTASSIUM SERPL-SCNC: 4.2 MMOL/L (ref 3.5–5.1)
PROT SERPL-MCNC: 6.9 G/DL (ref 6–8.4)
RBC # BLD AUTO: 4.06 M/UL (ref 4–5.4)
SODIUM SERPL-SCNC: 141 MMOL/L (ref 136–145)
TRIGL SERPL-MCNC: 90 MG/DL (ref 30–150)
WBC # BLD AUTO: 7.42 K/UL (ref 3.9–12.7)

## 2021-05-04 PROCEDURE — 99999 PR PBB SHADOW E&M-EST. PATIENT-LVL IV: ICD-10-PCS | Mod: PBBFAC,,, | Performed by: FAMILY MEDICINE

## 2021-05-04 PROCEDURE — 1126F AMNT PAIN NOTED NONE PRSNT: CPT | Mod: S$GLB,,, | Performed by: FAMILY MEDICINE

## 2021-05-04 PROCEDURE — 36415 COLL VENOUS BLD VENIPUNCTURE: CPT | Performed by: FAMILY MEDICINE

## 2021-05-04 PROCEDURE — 3008F BODY MASS INDEX DOCD: CPT | Mod: CPTII,S$GLB,, | Performed by: FAMILY MEDICINE

## 2021-05-04 PROCEDURE — 99396 PREV VISIT EST AGE 40-64: CPT | Mod: S$GLB,,, | Performed by: FAMILY MEDICINE

## 2021-05-04 PROCEDURE — 1126F PR PAIN SEVERITY QUANTIFIED, NO PAIN PRESENT: ICD-10-PCS | Mod: S$GLB,,, | Performed by: FAMILY MEDICINE

## 2021-05-04 PROCEDURE — 3008F PR BODY MASS INDEX (BMI) DOCUMENTED: ICD-10-PCS | Mod: CPTII,S$GLB,, | Performed by: FAMILY MEDICINE

## 2021-05-04 PROCEDURE — 80053 COMPREHEN METABOLIC PANEL: CPT | Performed by: FAMILY MEDICINE

## 2021-05-04 PROCEDURE — 83735 ASSAY OF MAGNESIUM: CPT | Performed by: FAMILY MEDICINE

## 2021-05-04 PROCEDURE — 80061 LIPID PANEL: CPT | Performed by: FAMILY MEDICINE

## 2021-05-04 PROCEDURE — 77067 SCR MAMMO BI INCL CAD: CPT | Mod: TC,PO

## 2021-05-04 PROCEDURE — 99999 PR PBB SHADOW E&M-EST. PATIENT-LVL IV: CPT | Mod: PBBFAC,,, | Performed by: FAMILY MEDICINE

## 2021-05-04 PROCEDURE — 85025 COMPLETE CBC W/AUTO DIFF WBC: CPT | Performed by: FAMILY MEDICINE

## 2021-05-04 PROCEDURE — 99396 PR PREVENTIVE VISIT,EST,40-64: ICD-10-PCS | Mod: S$GLB,,, | Performed by: FAMILY MEDICINE

## 2021-05-04 RX ORDER — BUPROPION HYDROCHLORIDE 150 MG/1
150 TABLET ORAL DAILY
Qty: 30 TABLET | Refills: 5 | Status: SHIPPED | OUTPATIENT
Start: 2021-05-04 | End: 2022-04-06

## 2021-05-05 ENCOUNTER — PATIENT MESSAGE (OUTPATIENT)
Dept: GASTROENTEROLOGY | Facility: CLINIC | Age: 61
End: 2021-05-05

## 2021-05-18 ENCOUNTER — PATIENT OUTREACH (OUTPATIENT)
Dept: ADMINISTRATIVE | Facility: OTHER | Age: 61
End: 2021-05-18

## 2021-05-19 ENCOUNTER — OFFICE VISIT (OUTPATIENT)
Dept: DERMATOLOGY | Facility: CLINIC | Age: 61
End: 2021-05-19
Payer: COMMERCIAL

## 2021-05-19 DIAGNOSIS — D48.5 NEOPLASM OF UNCERTAIN BEHAVIOR OF SKIN: Primary | ICD-10-CM

## 2021-05-19 DIAGNOSIS — L57.0 ACTINIC KERATOSES: ICD-10-CM

## 2021-05-19 DIAGNOSIS — D18.01 CHERRY ANGIOMA: ICD-10-CM

## 2021-05-19 DIAGNOSIS — D22.9 MULTIPLE BENIGN NEVI: ICD-10-CM

## 2021-05-19 DIAGNOSIS — L81.4 SOLAR LENTIGO: ICD-10-CM

## 2021-05-19 PROCEDURE — 88342 CHG IMMUNOCYTOCHEMISTRY: ICD-10-PCS | Mod: 26,,, | Performed by: PATHOLOGY

## 2021-05-19 PROCEDURE — 88305 TISSUE EXAM BY PATHOLOGIST: CPT | Performed by: PATHOLOGY

## 2021-05-19 PROCEDURE — 99213 PR OFFICE/OUTPT VISIT, EST, LEVL III, 20-29 MIN: ICD-10-PCS | Mod: 25,S$GLB,, | Performed by: DERMATOLOGY

## 2021-05-19 PROCEDURE — 88305 TISSUE EXAM BY PATHOLOGIST: ICD-10-PCS | Mod: 26,,, | Performed by: PATHOLOGY

## 2021-05-19 PROCEDURE — 88312 SPECIAL STAINS GROUP 1: CPT | Performed by: PATHOLOGY

## 2021-05-19 PROCEDURE — 11102 TANGNTL BX SKIN SINGLE LES: CPT | Mod: S$GLB,,, | Performed by: DERMATOLOGY

## 2021-05-19 PROCEDURE — 88312 SPECIAL STAINS GROUP 1: CPT | Mod: 26,,, | Performed by: PATHOLOGY

## 2021-05-19 PROCEDURE — 88305 TISSUE EXAM BY PATHOLOGIST: CPT | Mod: 26,,, | Performed by: PATHOLOGY

## 2021-05-19 PROCEDURE — 99213 OFFICE O/P EST LOW 20 MIN: CPT | Mod: 25,S$GLB,, | Performed by: DERMATOLOGY

## 2021-05-19 PROCEDURE — 17003 DESTRUCT PREMALG LES 2-14: CPT | Mod: S$GLB,,, | Performed by: DERMATOLOGY

## 2021-05-19 PROCEDURE — 17003 DESTRUCTION, PREMALIGNANT LESIONS; SECOND THROUGH 14 LESIONS: ICD-10-PCS | Mod: S$GLB,,, | Performed by: DERMATOLOGY

## 2021-05-19 PROCEDURE — 88312 PR  SPECIAL STAINS,GROUP I: ICD-10-PCS | Mod: 26,,, | Performed by: PATHOLOGY

## 2021-05-19 PROCEDURE — 99999 PR PBB SHADOW E&M-EST. PATIENT-LVL II: CPT | Mod: PBBFAC,,, | Performed by: DERMATOLOGY

## 2021-05-19 PROCEDURE — 99999 PR PBB SHADOW E&M-EST. PATIENT-LVL II: ICD-10-PCS | Mod: PBBFAC,,, | Performed by: DERMATOLOGY

## 2021-05-19 PROCEDURE — 17000 PR DESTRUCTION(LASER SURGERY,CRYOSURGERY,CHEMOSURGERY),PREMALIGNANT LESIONS,FIRST LESION: ICD-10-PCS | Mod: 59,S$GLB,, | Performed by: DERMATOLOGY

## 2021-05-19 PROCEDURE — 11102 PR TANGENTIAL BIOPSY, SKIN, SINGLE LESION: ICD-10-PCS | Mod: S$GLB,,, | Performed by: DERMATOLOGY

## 2021-05-19 PROCEDURE — 17000 DESTRUCT PREMALG LESION: CPT | Mod: 59,S$GLB,, | Performed by: DERMATOLOGY

## 2021-05-19 PROCEDURE — 88342 IMHCHEM/IMCYTCHM 1ST ANTB: CPT | Performed by: PATHOLOGY

## 2021-05-19 PROCEDURE — 88342 IMHCHEM/IMCYTCHM 1ST ANTB: CPT | Mod: 26,,, | Performed by: PATHOLOGY

## 2021-05-24 LAB
FINAL PATHOLOGIC DIAGNOSIS: NORMAL
GROSS: NORMAL
Lab: NORMAL
MICROSCOPIC EXAM: NORMAL

## 2021-08-11 ENCOUNTER — OFFICE VISIT (OUTPATIENT)
Dept: FAMILY MEDICINE | Facility: CLINIC | Age: 61
End: 2021-08-11
Payer: COMMERCIAL

## 2021-08-11 DIAGNOSIS — R09.82 POST-NASAL DRIP: Primary | ICD-10-CM

## 2021-08-11 PROCEDURE — 1160F PR REVIEW ALL MEDS BY PRESCRIBER/CLIN PHARMACIST DOCUMENTED: ICD-10-PCS | Mod: CPTII,,, | Performed by: FAMILY MEDICINE

## 2021-08-11 PROCEDURE — 1159F MED LIST DOCD IN RCRD: CPT | Mod: CPTII,,, | Performed by: FAMILY MEDICINE

## 2021-08-11 PROCEDURE — 1159F PR MEDICATION LIST DOCUMENTED IN MEDICAL RECORD: ICD-10-PCS | Mod: CPTII,,, | Performed by: FAMILY MEDICINE

## 2021-08-11 PROCEDURE — 1160F RVW MEDS BY RX/DR IN RCRD: CPT | Mod: CPTII,,, | Performed by: FAMILY MEDICINE

## 2021-08-11 PROCEDURE — 99213 PR OFFICE/OUTPT VISIT, EST, LEVL III, 20-29 MIN: ICD-10-PCS | Mod: 95,,, | Performed by: FAMILY MEDICINE

## 2021-08-11 PROCEDURE — 99213 OFFICE O/P EST LOW 20 MIN: CPT | Mod: 95,,, | Performed by: FAMILY MEDICINE

## 2021-08-11 RX ORDER — METHYLPREDNISOLONE 4 MG/1
TABLET ORAL
Qty: 1 PACKAGE | Refills: 0 | Status: SHIPPED | OUTPATIENT
Start: 2021-08-11 | End: 2022-02-23 | Stop reason: ALTCHOICE

## 2022-01-01 ENCOUNTER — PATIENT MESSAGE (OUTPATIENT)
Dept: FAMILY MEDICINE | Facility: CLINIC | Age: 62
End: 2022-01-01
Payer: COMMERCIAL

## 2022-02-23 ENCOUNTER — OFFICE VISIT (OUTPATIENT)
Dept: FAMILY MEDICINE | Facility: CLINIC | Age: 62
End: 2022-02-23
Attending: FAMILY MEDICINE
Payer: COMMERCIAL

## 2022-02-23 VITALS
SYSTOLIC BLOOD PRESSURE: 122 MMHG | HEART RATE: 76 BPM | DIASTOLIC BLOOD PRESSURE: 74 MMHG | RESPIRATION RATE: 18 BRPM | OXYGEN SATURATION: 96 % | HEIGHT: 63 IN | WEIGHT: 277.44 LBS | TEMPERATURE: 98 F | BODY MASS INDEX: 49.16 KG/M2

## 2022-02-23 DIAGNOSIS — L40.9 PSORIASIS: ICD-10-CM

## 2022-02-23 DIAGNOSIS — Z01.818 PREOP EXAMINATION: ICD-10-CM

## 2022-02-23 DIAGNOSIS — H25.9 AGE-RELATED CATARACT OF BOTH EYES, UNSPECIFIED AGE-RELATED CATARACT TYPE: Primary | ICD-10-CM

## 2022-02-23 DIAGNOSIS — F41.9 ANXIETY: ICD-10-CM

## 2022-02-23 DIAGNOSIS — E66.01 MORBID OBESITY WITH BMI OF 45.0-49.9, ADULT: ICD-10-CM

## 2022-02-23 DIAGNOSIS — E78.5 HYPERLIPIDEMIA, UNSPECIFIED HYPERLIPIDEMIA TYPE: ICD-10-CM

## 2022-02-23 DIAGNOSIS — F32.5 MAJOR DEPRESSION IN REMISSION: ICD-10-CM

## 2022-02-23 PROCEDURE — 99212 PR OFFICE/OUTPT VISIT, EST, LEVL II, 10-19 MIN: ICD-10-PCS | Mod: S$GLB,,, | Performed by: FAMILY MEDICINE

## 2022-02-23 PROCEDURE — 3078F DIAST BP <80 MM HG: CPT | Mod: CPTII,S$GLB,, | Performed by: FAMILY MEDICINE

## 2022-02-23 PROCEDURE — 99212 OFFICE O/P EST SF 10 MIN: CPT | Mod: S$GLB,,, | Performed by: FAMILY MEDICINE

## 2022-02-23 PROCEDURE — 1159F MED LIST DOCD IN RCRD: CPT | Mod: CPTII,S$GLB,, | Performed by: FAMILY MEDICINE

## 2022-02-23 PROCEDURE — 99999 PR PBB SHADOW E&M-EST. PATIENT-LVL III: ICD-10-PCS | Mod: PBBFAC,,, | Performed by: FAMILY MEDICINE

## 2022-02-23 PROCEDURE — 3008F BODY MASS INDEX DOCD: CPT | Mod: CPTII,S$GLB,, | Performed by: FAMILY MEDICINE

## 2022-02-23 PROCEDURE — 3008F PR BODY MASS INDEX (BMI) DOCUMENTED: ICD-10-PCS | Mod: CPTII,S$GLB,, | Performed by: FAMILY MEDICINE

## 2022-02-23 PROCEDURE — 3078F PR MOST RECENT DIASTOLIC BLOOD PRESSURE < 80 MM HG: ICD-10-PCS | Mod: CPTII,S$GLB,, | Performed by: FAMILY MEDICINE

## 2022-02-23 PROCEDURE — 1159F PR MEDICATION LIST DOCUMENTED IN MEDICAL RECORD: ICD-10-PCS | Mod: CPTII,S$GLB,, | Performed by: FAMILY MEDICINE

## 2022-02-23 PROCEDURE — 3074F SYST BP LT 130 MM HG: CPT | Mod: CPTII,S$GLB,, | Performed by: FAMILY MEDICINE

## 2022-02-23 PROCEDURE — 1160F PR REVIEW ALL MEDS BY PRESCRIBER/CLIN PHARMACIST DOCUMENTED: ICD-10-PCS | Mod: CPTII,S$GLB,, | Performed by: FAMILY MEDICINE

## 2022-02-23 PROCEDURE — 3074F PR MOST RECENT SYSTOLIC BLOOD PRESSURE < 130 MM HG: ICD-10-PCS | Mod: CPTII,S$GLB,, | Performed by: FAMILY MEDICINE

## 2022-02-23 PROCEDURE — 1160F RVW MEDS BY RX/DR IN RCRD: CPT | Mod: CPTII,S$GLB,, | Performed by: FAMILY MEDICINE

## 2022-02-23 PROCEDURE — 99999 PR PBB SHADOW E&M-EST. PATIENT-LVL III: CPT | Mod: PBBFAC,,, | Performed by: FAMILY MEDICINE

## 2022-02-23 RX ORDER — TIMOLOL MALEATE 5 MG/ML
1 SOLUTION/ DROPS OPHTHALMIC 2 TIMES DAILY
COMMUNITY
Start: 2022-02-19

## 2022-02-23 NOTE — PROGRESS NOTES
Subjective:       Patient ID: Amanda Clark is a 62 y.o. female.    Chief Complaint: Pre-op Exam (Pt states that she is here for her pre-op clearance )    62-year-old female coming in for preop clearance for cataract surgery to be done by Dr. Wilson the 1st eye to be done , the 2nd not yet scheduled.  Patient has no active complaints at this time and is looking forward to the surgery.  She has a history of anxiety and major depression in remission, psoriasis, hyperlipidemia controlled with diet, arthritis and herniated disc of L4 status post laminotomy.  She reports that she had her Pap smear and mammogram with Dr. Berrios in May of 2021 and we have not yet received a copy, it will be requested.  She is otherwise due for the shingles vaccine series, the mo during a booster, and her colonoscopy.  She will be coming in in May for her annual exam.    Past Medical History:  No date: Anxiety  No date: Arthritis  No date: Back pain  No date: Cancer      Comment:  precancerous skin lesion  No date: Depression  2019: Ganglion cyst of wrist, left      Comment:  Dr Alves   No date: Herniated disc      Comment:  L-4   No date: Hyperlipidemia  No date: Knee pain  No date: Psoriasis    Past Surgical History:  No date: ADENOIDECTOMY  No date: arthroscopic left knee  No date: BACK injections      Comment:   & 2020: CARPAL TUNNEL RELEASE; Right  No date:  SECTION  1/25/10: COLONOSCOPY      Comment:  Dr. Coronado, 10 year recheck  2016: JOINT REPLACEMENT      Comment:  right knee   2015: JOINT REPLACEMENT      Comment:  left knee   2015: LAMINOTOMY      Comment:  Dr Lazo  No date: STEROID INJECTION KNEE  No date: TONSILLECTOMY  No date: vocal cord nodule removal    Review of patient's family history indicates:  Problem: Heart disease      Relation: Mother          Age of Onset: (Not Specified)  Problem: Diabetes      Relation: Mother          Age of Onset: (Not  Specified)  Problem: Psoriasis      Relation: Mother          Age of Onset: (Not Specified)  Problem: Macular degeneration      Relation: Mother          Age of Onset: (Not Specified)  Problem: Stroke      Relation: Mother          Age of Onset: (Not Specified)  Problem: Hearing loss      Relation: Mother          Age of Onset: (Not Specified)  Problem: Arthritis      Relation: Mother          Age of Onset: (Not Specified)  Problem: Cancer      Relation: Father          Age of Onset: (Not Specified)          Comment: bone, prostate  Problem: Heart disease      Relation: Sister          Age of Onset: (Not Specified)  Problem: Diabetes      Relation: Sister          Age of Onset: (Not Specified)  Problem: Hypertension      Relation: Sister          Age of Onset: (Not Specified)  Problem: Depression      Relation: Sister          Age of Onset: (Not Specified)  Problem: Macular degeneration      Relation: Sister          Age of Onset: (Not Specified)  Problem: Heart disease      Relation: Maternal Aunt          Age of Onset: (Not Specified)  Problem: Dementia      Relation: Maternal Aunt          Age of Onset: (Not Specified)  Problem: Cancer      Relation: Paternal Uncle          Age of Onset: (Not Specified)          Comment: tumor abdominal  Problem: Heart disease      Relation: Paternal Uncle          Age of Onset: (Not Specified)  Problem: Dementia      Relation: Paternal Uncle          Age of Onset: (Not Specified)  Problem: No Known Problems      Relation: Daughter          Age of Onset: (Not Specified)  Problem: Melanoma      Relation: Neg Hx          Age of Onset: (Not Specified)  Problem: Lupus      Relation: Neg Hx          Age of Onset: (Not Specified)  Problem: Eczema      Relation: Neg Hx          Age of Onset: (Not Specified)    Social History    Tobacco Use      Smoking status: Former Smoker        Types: Cigarettes        Quit date: 1988        Years since quittin.0      Smokeless tobacco:  Never Used    Alcohol use: Yes      Comment: sometimes    Drug use: No    Current Outpatient Medications on File Prior to Visit:  buPROPion (WELLBUTRIN XL) 150 MG TB24 tablet, Take 1 tablet (150 mg total) by mouth once daily., Disp: 30 tablet, Rfl: 5  timolol maleate 0.5% (TIMOPTIC) 0.5 % Drop, Place 1 drop into both eyes 2 (two) times daily., Disp: , Rfl:       Review of Systems   Constitutional: Negative for chills, diaphoresis, fatigue, fever and unexpected weight change.   HENT: Negative for congestion, ear pain, hearing loss, postnasal drip and sinus pressure.    Eyes: Positive for visual disturbance. Negative for itching.   Respiratory: Negative for cough, chest tightness, shortness of breath and wheezing.    Cardiovascular: Negative for chest pain, palpitations and leg swelling.   Gastrointestinal: Negative for abdominal pain, blood in stool, constipation, diarrhea, nausea and vomiting.   Genitourinary: Negative for dysuria, frequency and hematuria.   Musculoskeletal: Negative for arthralgias, back pain, joint swelling and myalgias.   Neurological: Negative for dizziness and headaches.   Hematological: Negative for adenopathy.   Psychiatric/Behavioral: Negative for sleep disturbance. The patient is not nervous/anxious.        Objective:      Physical Exam  Vitals and nursing note reviewed.   Constitutional:       General: She is not in acute distress.     Appearance: Normal appearance. She is well-developed. She is obese. She is not ill-appearing, toxic-appearing or diaphoretic.      Comments: Good blood pressure control  Morbid obesity with a BMI of 49.2 she is up 1.4 lb from her last physical May 4, 2021   HENT:      Head: Normocephalic and atraumatic.      Right Ear: Tympanic membrane, ear canal and external ear normal. There is no impacted cerumen.      Left Ear: Tympanic membrane, ear canal and external ear normal. There is no impacted cerumen.      Nose: Nose normal. No congestion or rhinorrhea.       Mouth/Throat:      Mouth: Mucous membranes are moist.      Pharynx: Oropharynx is clear. No oropharyngeal exudate or posterior oropharyngeal erythema.   Eyes:      General: No scleral icterus.        Right eye: No discharge.         Left eye: No discharge.      Extraocular Movements: Extraocular movements intact.      Conjunctiva/sclera: Conjunctivae normal.      Pupils: Pupils are equal, round, and reactive to light.      Comments: Bilateral cataracts the left appears worse than the right   Neck:      Thyroid: No thyromegaly.      Vascular: No carotid bruit or JVD.   Cardiovascular:      Rate and Rhythm: Normal rate and regular rhythm.      Heart sounds: Normal heart sounds. No murmur heard.    No friction rub. No gallop.   Pulmonary:      Effort: Pulmonary effort is normal. No respiratory distress.      Breath sounds: Normal breath sounds. No stridor. No wheezing, rhonchi or rales.   Chest:      Chest wall: No tenderness.   Abdominal:      General: Bowel sounds are normal. There is no distension.      Palpations: Abdomen is soft. There is no mass.      Tenderness: There is no abdominal tenderness. There is no guarding or rebound.      Hernia: No hernia is present.   Musculoskeletal:         General: No swelling, tenderness, deformity or signs of injury. Normal range of motion.      Cervical back: Normal range of motion and neck supple. No rigidity or tenderness.      Right lower leg: No edema.      Left lower leg: No edema.   Lymphadenopathy:      Cervical: No cervical adenopathy.   Skin:     General: Skin is warm and dry.      Coloration: Skin is not jaundiced or pale.      Findings: No bruising, erythema, lesion or rash.   Neurological:      General: No focal deficit present.      Mental Status: She is alert and oriented to person, place, and time. Mental status is at baseline.      Cranial Nerves: No cranial nerve deficit.      Sensory: No sensory deficit.      Motor: No weakness.      Coordination:  Coordination normal.      Gait: Gait normal.      Deep Tendon Reflexes: Reflexes are normal and symmetric. Reflexes normal.   Psychiatric:         Mood and Affect: Mood normal.         Behavior: Behavior normal.         Thought Content: Thought content normal.         Judgment: Judgment normal.         Assessment:       1. Age-related cataract of both eyes, unspecified age-related cataract type    2. Preop examination    3. Major depression in remission    4. Anxiety    5. Psoriasis    6. Hyperlipidemia, unspecified hyperlipidemia type    7. Morbid obesity with BMI of 45.0-49.9, adult        Plan:       1. Age-related cataract of both eyes, unspecified age-related cataract type    2. Preop examination  Patient is clear for surgery at low risk.  Form completed and faxed to Dr. Wilson's office.  Copy kept for epic record    3. Major depression in remission  Well controlled with Wellbutrin  daily    4. Anxiety  Asymptomatic    5. Psoriasis  Asymptomatic    6. Hyperlipidemia, unspecified hyperlipidemia type  Will check with next physical in May    7. Morbid obesity with BMI of 45.0-49.9, adult    I spent 18 minutes on this encounter.  This time includes face-to-face time, orders, chart review, test review, and documentation.

## 2022-02-25 ENCOUNTER — PATIENT OUTREACH (OUTPATIENT)
Dept: ADMINISTRATIVE | Facility: HOSPITAL | Age: 62
End: 2022-02-25
Payer: COMMERCIAL

## 2022-04-04 DIAGNOSIS — F32.5 MAJOR DEPRESSION IN REMISSION: ICD-10-CM

## 2022-04-04 NOTE — TELEPHONE ENCOUNTER
No new care gaps identified.  Powered by [employer]. Reference number: [id number].   4/04/2022 12:37:45 PM CDT

## 2022-04-06 DIAGNOSIS — F32.5 MAJOR DEPRESSION IN REMISSION: ICD-10-CM

## 2022-04-06 RX ORDER — BUPROPION HYDROCHLORIDE 150 MG/1
TABLET ORAL
Qty: 90 TABLET | Refills: 3 | Status: SHIPPED | OUTPATIENT
Start: 2022-04-06 | End: 2022-07-18 | Stop reason: ALTCHOICE

## 2022-04-06 RX ORDER — BUPROPION HYDROCHLORIDE 150 MG/1
TABLET ORAL
Qty: 30 TABLET | Refills: 0 | OUTPATIENT
Start: 2022-04-06

## 2022-04-06 NOTE — TELEPHONE ENCOUNTER
Refill Authorization Note   Amanda Clark  is requesting a refill authorization.  Brief Assessment and Rationale for Refill:  Approve     Medication Therapy Plan:       Medication Reconciliation Completed: No   Comments:     No Care Gaps recommended.     Note composed:8:24 AM 04/06/2022

## 2022-04-06 NOTE — TELEPHONE ENCOUNTER
No new care gaps identified.  Powered by [employer]. Reference number: [id number].   4/06/2022 8:39:33 AM CDT

## 2022-05-11 DIAGNOSIS — Z12.31 OTHER SCREENING MAMMOGRAM: ICD-10-CM

## 2022-05-16 ENCOUNTER — PATIENT MESSAGE (OUTPATIENT)
Dept: ADMINISTRATIVE | Facility: HOSPITAL | Age: 62
End: 2022-05-16
Payer: COMMERCIAL

## 2022-05-31 ENCOUNTER — PATIENT MESSAGE (OUTPATIENT)
Dept: ADMINISTRATIVE | Facility: HOSPITAL | Age: 62
End: 2022-05-31
Payer: COMMERCIAL

## 2022-06-30 ENCOUNTER — PATIENT MESSAGE (OUTPATIENT)
Dept: FAMILY MEDICINE | Facility: CLINIC | Age: 62
End: 2022-06-30
Payer: COMMERCIAL

## 2022-06-30 DIAGNOSIS — R73.03 PREDIABETES: ICD-10-CM

## 2022-06-30 DIAGNOSIS — E78.5 HYPERLIPIDEMIA, UNSPECIFIED HYPERLIPIDEMIA TYPE: Primary | ICD-10-CM

## 2022-06-30 DIAGNOSIS — Z00.00 ENCOUNTER FOR PREVENTIVE HEALTH EXAMINATION: ICD-10-CM

## 2022-06-30 NOTE — TELEPHONE ENCOUNTER
Call placed to patient for notification. Patient states she has scheduled appointment for mammogram. Lab appointment scheduled at the time of call. Patient agreed to appointment date, time, and location. Patient states she wants to wait to schedule colonoscopy. States she would like to speak to Dr. Cruz at time of office visit regarding colonoscopy.

## 2022-06-30 NOTE — TELEPHONE ENCOUNTER
Orders are in for CMP, CBC, A1c, lipid panel.  She already has a mammogram scheduled later in July.  She is also due for a colonoscopy

## 2022-07-11 ENCOUNTER — LAB VISIT (OUTPATIENT)
Dept: LAB | Facility: HOSPITAL | Age: 62
End: 2022-07-11
Attending: FAMILY MEDICINE
Payer: COMMERCIAL

## 2022-07-11 DIAGNOSIS — E78.5 HYPERLIPIDEMIA, UNSPECIFIED HYPERLIPIDEMIA TYPE: ICD-10-CM

## 2022-07-11 DIAGNOSIS — Z00.00 ENCOUNTER FOR PREVENTIVE HEALTH EXAMINATION: ICD-10-CM

## 2022-07-11 DIAGNOSIS — R73.03 PREDIABETES: ICD-10-CM

## 2022-07-11 LAB
ALBUMIN SERPL BCP-MCNC: 3.4 G/DL (ref 3.5–5.2)
ALP SERPL-CCNC: 84 U/L (ref 55–135)
ALT SERPL W/O P-5'-P-CCNC: 22 U/L (ref 10–44)
ANION GAP SERPL CALC-SCNC: 7 MMOL/L (ref 8–16)
AST SERPL-CCNC: 22 U/L (ref 10–40)
BASOPHILS # BLD AUTO: 0.07 K/UL (ref 0–0.2)
BASOPHILS NFR BLD: 1 % (ref 0–1.9)
BILIRUB SERPL-MCNC: 0.3 MG/DL (ref 0.1–1)
BUN SERPL-MCNC: 23 MG/DL (ref 8–23)
CALCIUM SERPL-MCNC: 9 MG/DL (ref 8.7–10.5)
CHLORIDE SERPL-SCNC: 107 MMOL/L (ref 95–110)
CHOLEST SERPL-MCNC: 187 MG/DL (ref 120–199)
CHOLEST/HDLC SERPL: 4.7 {RATIO} (ref 2–5)
CO2 SERPL-SCNC: 25 MMOL/L (ref 23–29)
CREAT SERPL-MCNC: 0.8 MG/DL (ref 0.5–1.4)
DIFFERENTIAL METHOD: ABNORMAL
EOSINOPHIL # BLD AUTO: 0.2 K/UL (ref 0–0.5)
EOSINOPHIL NFR BLD: 3.1 % (ref 0–8)
ERYTHROCYTE [DISTWIDTH] IN BLOOD BY AUTOMATED COUNT: 15.2 % (ref 11.5–14.5)
EST. GFR  (AFRICAN AMERICAN): >60 ML/MIN/1.73 M^2
EST. GFR  (NON AFRICAN AMERICAN): >60 ML/MIN/1.73 M^2
ESTIMATED AVG GLUCOSE: 134 MG/DL (ref 68–131)
GLUCOSE SERPL-MCNC: 109 MG/DL (ref 70–110)
HBA1C MFR BLD: 6.3 % (ref 4–5.6)
HCT VFR BLD AUTO: 40.2 % (ref 37–48.5)
HDLC SERPL-MCNC: 40 MG/DL (ref 40–75)
HDLC SERPL: 21.4 % (ref 20–50)
HGB BLD-MCNC: 12.4 G/DL (ref 12–16)
IMM GRANULOCYTES # BLD AUTO: 0.02 K/UL (ref 0–0.04)
IMM GRANULOCYTES NFR BLD AUTO: 0.3 % (ref 0–0.5)
LDLC SERPL CALC-MCNC: 110.6 MG/DL (ref 63–159)
LYMPHOCYTES # BLD AUTO: 1.7 K/UL (ref 1–4.8)
LYMPHOCYTES NFR BLD: 24.4 % (ref 18–48)
MCH RBC QN AUTO: 29.7 PG (ref 27–31)
MCHC RBC AUTO-ENTMCNC: 30.8 G/DL (ref 32–36)
MCV RBC AUTO: 96 FL (ref 82–98)
MONOCYTES # BLD AUTO: 0.7 K/UL (ref 0.3–1)
MONOCYTES NFR BLD: 9.7 % (ref 4–15)
NEUTROPHILS # BLD AUTO: 4.3 K/UL (ref 1.8–7.7)
NEUTROPHILS NFR BLD: 61.5 % (ref 38–73)
NONHDLC SERPL-MCNC: 147 MG/DL
NRBC BLD-RTO: 0 /100 WBC
PLATELET # BLD AUTO: 292 K/UL (ref 150–450)
PMV BLD AUTO: 11 FL (ref 9.2–12.9)
POTASSIUM SERPL-SCNC: 4.2 MMOL/L (ref 3.5–5.1)
PROT SERPL-MCNC: 6.7 G/DL (ref 6–8.4)
RBC # BLD AUTO: 4.18 M/UL (ref 4–5.4)
SODIUM SERPL-SCNC: 139 MMOL/L (ref 136–145)
TRIGL SERPL-MCNC: 182 MG/DL (ref 30–150)
WBC # BLD AUTO: 7.04 K/UL (ref 3.9–12.7)

## 2022-07-11 PROCEDURE — 85025 COMPLETE CBC W/AUTO DIFF WBC: CPT | Performed by: FAMILY MEDICINE

## 2022-07-11 PROCEDURE — 36415 COLL VENOUS BLD VENIPUNCTURE: CPT | Mod: PO | Performed by: FAMILY MEDICINE

## 2022-07-11 PROCEDURE — 80053 COMPREHEN METABOLIC PANEL: CPT | Performed by: FAMILY MEDICINE

## 2022-07-11 PROCEDURE — 83036 HEMOGLOBIN GLYCOSYLATED A1C: CPT | Performed by: FAMILY MEDICINE

## 2022-07-11 PROCEDURE — 80061 LIPID PANEL: CPT | Performed by: FAMILY MEDICINE

## 2022-07-18 ENCOUNTER — OFFICE VISIT (OUTPATIENT)
Dept: FAMILY MEDICINE | Facility: CLINIC | Age: 62
End: 2022-07-18
Attending: FAMILY MEDICINE
Payer: COMMERCIAL

## 2022-07-18 VITALS
SYSTOLIC BLOOD PRESSURE: 139 MMHG | HEART RATE: 79 BPM | RESPIRATION RATE: 18 BRPM | WEIGHT: 282.94 LBS | TEMPERATURE: 99 F | OXYGEN SATURATION: 95 % | HEIGHT: 63 IN | DIASTOLIC BLOOD PRESSURE: 67 MMHG | BODY MASS INDEX: 50.13 KG/M2

## 2022-07-18 DIAGNOSIS — E66.01 MORBID OBESITY WITH BMI OF 50.0-59.9, ADULT: ICD-10-CM

## 2022-07-18 DIAGNOSIS — R73.03 PREDIABETES: ICD-10-CM

## 2022-07-18 DIAGNOSIS — F32.5 MAJOR DEPRESSION IN REMISSION: ICD-10-CM

## 2022-07-18 DIAGNOSIS — E78.5 HYPERLIPIDEMIA, UNSPECIFIED HYPERLIPIDEMIA TYPE: ICD-10-CM

## 2022-07-18 DIAGNOSIS — Z00.00 ENCOUNTER FOR PREVENTIVE HEALTH EXAMINATION: Primary | ICD-10-CM

## 2022-07-18 DIAGNOSIS — Z12.11 COLON CANCER SCREENING: ICD-10-CM

## 2022-07-18 DIAGNOSIS — L40.9 PSORIASIS: ICD-10-CM

## 2022-07-18 PROCEDURE — 1160F PR REVIEW ALL MEDS BY PRESCRIBER/CLIN PHARMACIST DOCUMENTED: ICD-10-PCS | Mod: CPTII,S$GLB,, | Performed by: FAMILY MEDICINE

## 2022-07-18 PROCEDURE — 3044F PR MOST RECENT HEMOGLOBIN A1C LEVEL <7.0%: ICD-10-PCS | Mod: CPTII,S$GLB,, | Performed by: FAMILY MEDICINE

## 2022-07-18 PROCEDURE — 1159F PR MEDICATION LIST DOCUMENTED IN MEDICAL RECORD: ICD-10-PCS | Mod: CPTII,S$GLB,, | Performed by: FAMILY MEDICINE

## 2022-07-18 PROCEDURE — 1159F MED LIST DOCD IN RCRD: CPT | Mod: CPTII,S$GLB,, | Performed by: FAMILY MEDICINE

## 2022-07-18 PROCEDURE — 99396 PREV VISIT EST AGE 40-64: CPT | Mod: S$GLB,,, | Performed by: FAMILY MEDICINE

## 2022-07-18 PROCEDURE — 3075F SYST BP GE 130 - 139MM HG: CPT | Mod: CPTII,S$GLB,, | Performed by: FAMILY MEDICINE

## 2022-07-18 PROCEDURE — 3078F DIAST BP <80 MM HG: CPT | Mod: CPTII,S$GLB,, | Performed by: FAMILY MEDICINE

## 2022-07-18 PROCEDURE — 3008F PR BODY MASS INDEX (BMI) DOCUMENTED: ICD-10-PCS | Mod: CPTII,S$GLB,, | Performed by: FAMILY MEDICINE

## 2022-07-18 PROCEDURE — 99396 PR PREVENTIVE VISIT,EST,40-64: ICD-10-PCS | Mod: S$GLB,,, | Performed by: FAMILY MEDICINE

## 2022-07-18 PROCEDURE — 99999 PR PBB SHADOW E&M-EST. PATIENT-LVL III: ICD-10-PCS | Mod: PBBFAC,,, | Performed by: FAMILY MEDICINE

## 2022-07-18 PROCEDURE — 3075F PR MOST RECENT SYSTOLIC BLOOD PRESS GE 130-139MM HG: ICD-10-PCS | Mod: CPTII,S$GLB,, | Performed by: FAMILY MEDICINE

## 2022-07-18 PROCEDURE — 99999 PR PBB SHADOW E&M-EST. PATIENT-LVL III: CPT | Mod: PBBFAC,,, | Performed by: FAMILY MEDICINE

## 2022-07-18 PROCEDURE — 3008F BODY MASS INDEX DOCD: CPT | Mod: CPTII,S$GLB,, | Performed by: FAMILY MEDICINE

## 2022-07-18 PROCEDURE — 1160F RVW MEDS BY RX/DR IN RCRD: CPT | Mod: CPTII,S$GLB,, | Performed by: FAMILY MEDICINE

## 2022-07-18 PROCEDURE — 3044F HG A1C LEVEL LT 7.0%: CPT | Mod: CPTII,S$GLB,, | Performed by: FAMILY MEDICINE

## 2022-07-18 PROCEDURE — 3078F PR MOST RECENT DIASTOLIC BLOOD PRESSURE < 80 MM HG: ICD-10-PCS | Mod: CPTII,S$GLB,, | Performed by: FAMILY MEDICINE

## 2022-07-18 RX ORDER — FLUOXETINE HYDROCHLORIDE 40 MG/1
40 CAPSULE ORAL DAILY
Qty: 90 CAPSULE | Refills: 3 | Status: SHIPPED | OUTPATIENT
Start: 2022-07-18 | End: 2023-07-19

## 2022-07-18 NOTE — PROGRESS NOTES
Subjective:       Patient ID: Amanda Clark is a 62 y.o. female.    Chief Complaint: Annual Exam (Patient states that she is here for her annual exam )    62-year-old female coming in for annual exam.  Her lab was done last week and the results are available most of them satisfactory.  Her A1c has increased to 6.3, still prediabetic but getting closer to the diabetic threshold of 6.5.  Cholesterol levels were fairly satisfactory and slightly improved from last year and her blood count was normal.  The chemistry panel looks good.  She has a number of minor concerns with some stasis changes around the left ankle, some palpitations that occurred when working outdoors in excessive heat and dehydration and have not recurred and some questions regarding how often she can get steroid injections in joints and tendons.  She has a history of anxiety, major depressive disorder in remission but she feels she did better on the Prozac than her current Wellbutrin.  She switch to the Wellbutrin in an attempt to help improve her appetite and it was not very successful.  She has a history of psoriasis, hyperlipidemia, prediabetes, morbid obesity and arthritis.  She is due for a colonoscopy with no history of colon polyps.  Her mammogram is already scheduled for 2022.    Past Medical History:  No date: Anxiety  No date: Arthritis  No date: Back pain  No date: Cancer      Comment:  precancerous skin lesion  No date: Depression  2019: Ganglion cyst of wrist, left      Comment:  Dr Alves   No date: Herniated disc      Comment:  L-4   No date: Hyperlipidemia  No date: Knee pain  2022: Prediabetes  No date: Psoriasis    Past Surgical History:  No date: ADENOIDECTOMY  No date: arthroscopic left knee  No date: BACK injections      Comment:   & 2020: CARPAL TUNNEL RELEASE; Right  No date:  SECTION  1/25/10: COLONOSCOPY      Comment:  Dr. Coronado, 10 year recheck  2022: EYE SURGERY;  Bilateral      Comment:  bilateral cataract surgery 04/22 02/18/2016: JOINT REPLACEMENT      Comment:  right knee   11/03/2015: JOINT REPLACEMENT      Comment:  left knee   7-: LAMINOTOMY      Comment:  Dr Lazo  No date: STEROID INJECTION KNEE  No date: TONSILLECTOMY  No date: vocal cord nodule removal    Review of patient's family history indicates:  Problem: Heart disease      Relation: Mother          Age of Onset: (Not Specified)  Problem: Diabetes      Relation: Mother          Age of Onset: (Not Specified)  Problem: Psoriasis      Relation: Mother          Age of Onset: (Not Specified)  Problem: Macular degeneration      Relation: Mother          Age of Onset: (Not Specified)  Problem: Stroke      Relation: Mother          Age of Onset: (Not Specified)  Problem: Hearing loss      Relation: Mother          Age of Onset: (Not Specified)  Problem: Arthritis      Relation: Mother          Age of Onset: (Not Specified)  Problem: Cancer      Relation: Father          Age of Onset: (Not Specified)          Comment: bone, prostate  Problem: Heart disease      Relation: Sister          Age of Onset: (Not Specified)  Problem: Diabetes      Relation: Sister          Age of Onset: (Not Specified)  Problem: Hypertension      Relation: Sister          Age of Onset: (Not Specified)  Problem: Depression      Relation: Sister          Age of Onset: (Not Specified)  Problem: Macular degeneration      Relation: Sister          Age of Onset: (Not Specified)  Problem: Heart disease      Relation: Maternal Aunt          Age of Onset: (Not Specified)  Problem: Dementia      Relation: Maternal Aunt          Age of Onset: (Not Specified)  Problem: Cancer      Relation: Paternal Uncle          Age of Onset: (Not Specified)          Comment: tumor abdominal  Problem: Heart disease      Relation: Paternal Uncle          Age of Onset: (Not Specified)  Problem: Dementia      Relation: Paternal Uncle          Age of Onset: (Not  Specified)  Problem: No Known Problems      Relation: Daughter          Age of Onset: (Not Specified)  Problem: Melanoma      Relation: Neg Hx          Age of Onset: (Not Specified)  Problem: Lupus      Relation: Neg Hx          Age of Onset: (Not Specified)  Problem: Eczema      Relation: Neg Hx          Age of Onset: (Not Specified)    Social History    Tobacco Use      Smoking status: Former Smoker        Types: Cigarettes        Quit date: 1988        Years since quittin.4      Smokeless tobacco: Never Used    Alcohol use: Yes      Comment: sometimes    Drug use: No    Current Outpatient Medications on File Prior to Visit:  timolol maleate 0.5% (TIMOPTIC) 0.5 % Drop, Place 1 drop into both eyes 2 (two) times daily., Disp: , Rfl:   (DISCONTINUED) buPROPion (WELLBUTRIN XL) 150 MG TB24 tablet, Take 1 tablet by mouth once daily, Disp: 90 tablet, Rfl: 3    No current facility-administered medications on file prior to visit.         Review of Systems   Constitutional: Negative for chills, diaphoresis, fatigue, fever and unexpected weight change.   HENT: Negative for congestion, ear pain, hearing loss, postnasal drip and sinus pressure.    Eyes: Negative for itching and visual disturbance.   Respiratory: Negative for cough, chest tightness, shortness of breath and wheezing.    Cardiovascular: Negative for chest pain, palpitations and leg swelling.   Gastrointestinal: Negative for abdominal pain, blood in stool, constipation, diarrhea, nausea and vomiting.   Genitourinary: Negative for dysuria, frequency and hematuria.   Musculoskeletal: Negative for arthralgias, back pain, joint swelling and myalgias.   Skin: Positive for color change and rash.   Neurological: Negative for dizziness and headaches.   Hematological: Negative for adenopathy.   Psychiatric/Behavioral: Negative for sleep disturbance. The patient is not nervous/anxious.        Objective:      Physical Exam  Vitals and nursing note reviewed.    Constitutional:       General: She is not in acute distress.     Appearance: Normal appearance. She is well-developed. She is obese. She is not ill-appearing, toxic-appearing or diaphoretic.      Comments: Borderline systolic blood pressure  Morbid obesity with a BMI of 50.1 she is up 6.9 lb from her last annual checkup May 4, 2021   HENT:      Head: Normocephalic and atraumatic.      Right Ear: Tympanic membrane, ear canal and external ear normal. There is no impacted cerumen.      Left Ear: Tympanic membrane, ear canal and external ear normal. There is no impacted cerumen.      Nose: Nose normal. No congestion or rhinorrhea.      Mouth/Throat:      Mouth: Mucous membranes are moist.      Pharynx: Oropharynx is clear. No oropharyngeal exudate or posterior oropharyngeal erythema.   Eyes:      General: No scleral icterus.        Right eye: No discharge.         Left eye: No discharge.      Extraocular Movements: Extraocular movements intact.      Conjunctiva/sclera: Conjunctivae normal.      Pupils: Pupils are equal, round, and reactive to light.   Neck:      Thyroid: No thyromegaly.      Vascular: No carotid bruit or JVD.   Cardiovascular:      Rate and Rhythm: Normal rate and regular rhythm.      Heart sounds: Normal heart sounds. No murmur heard.    No friction rub. No gallop.   Pulmonary:      Effort: Pulmonary effort is normal. No respiratory distress.      Breath sounds: Normal breath sounds. No stridor. No wheezing, rhonchi or rales.   Chest:      Chest wall: No tenderness.   Abdominal:      General: Bowel sounds are normal. There is no distension.      Palpations: Abdomen is soft. There is no mass.      Tenderness: There is no abdominal tenderness. There is no guarding or rebound.      Hernia: No hernia is present.   Musculoskeletal:         General: No swelling, tenderness, deformity or signs of injury. Normal range of motion.      Cervical back: Normal range of motion and neck supple. No rigidity or  tenderness.      Right lower leg: No edema (Trace to 1+).      Left lower leg: No edema (Trace to 1+).   Lymphadenopathy:      Cervical: No cervical adenopathy.   Skin:     General: Skin is warm and dry.      Coloration: Skin is not jaundiced or pale.      Findings: Erythema (Mild blanching erythema just above the left ankle suggestive of early stasis changes) present. No bruising, lesion or rash.   Neurological:      General: No focal deficit present.      Mental Status: She is alert and oriented to person, place, and time. Mental status is at baseline.      Cranial Nerves: No cranial nerve deficit.      Sensory: No sensory deficit.      Motor: No weakness.      Coordination: Coordination normal.      Gait: Gait normal.      Deep Tendon Reflexes: Reflexes are normal and symmetric. Reflexes normal.   Psychiatric:         Mood and Affect: Mood normal.         Behavior: Behavior normal.         Thought Content: Thought content normal.         Judgment: Judgment normal.         Assessment:       1. Encounter for preventive health examination    2. Major depression in remission    3. Psoriasis    4. Hyperlipidemia, unspecified hyperlipidemia type    5. Prediabetes    6. Colon cancer screening    7. Morbid obesity with BMI of 50.0-59.9, adult        Plan:       1. Encounter for preventive health examination    2. Major depression in remission  Change back to Prozac 40 mg daily from current Wellbutrin  - FLUoxetine 40 MG capsule; Take 1 capsule (40 mg total) by mouth once daily.  Dispense: 90 capsule; Refill: 3    3. Psoriasis  Asymptomatic    4. Hyperlipidemia, unspecified hyperlipidemia type  Lab Results   Component Value Date    CHOL 187 07/11/2022    CHOL 177 05/04/2021    CHOL 187 02/22/2019     Lab Results   Component Value Date    HDL 40 07/11/2022    HDL 42 05/04/2021    HDL 38 (L) 02/22/2019     Lab Results   Component Value Date    LDLCALC 110.6 07/11/2022    LDLCALC 117.0 05/04/2021    LDLCALC 121.8 02/22/2019      Lab Results   Component Value Date    TRIG 182 (H) 07/11/2022    TRIG 90 05/04/2021    TRIG 136 02/22/2019     Lab Results   Component Value Date    CHOLHDL 21.4 07/11/2022    CHOLHDL 23.7 05/04/2021    CHOLHDL 20.3 02/22/2019     The 10-year ASCVD risk score (Shivani VERMA Jr., et al., 2013) is: 6%    Values used to calculate the score:      Age: 62 years      Sex: Female      Is Non- : No      Diabetic: No      Tobacco smoker: No      Systolic Blood Pressure: 139 mmHg      Is BP treated: No      HDL Cholesterol: 40 mg/dL      Total Cholesterol: 187 mg/dL        5. Prediabetes  Lab Results   Component Value Date    HGBA1C 6.3 (H) 07/11/2022     Discussed diet and exercise options    6. Colon cancer screening  Order placed for colonoscopy with previous history of minimal diverticulosis but no polyps  - Case Request Endoscopy: COLONOSCOPY    7. Morbid obesity with BMI of 50.0-59.9, adult

## 2022-07-19 ENCOUNTER — PATIENT OUTREACH (OUTPATIENT)
Dept: ADMINISTRATIVE | Facility: HOSPITAL | Age: 62
End: 2022-07-19
Payer: COMMERCIAL

## 2022-07-19 DIAGNOSIS — Z12.11 COLON CANCER SCREENING: Primary | ICD-10-CM

## 2022-07-19 NOTE — PROGRESS NOTES
Colon Cancer screening GAP report- GI referral placed and pt given scheduling's phone number to schedule.

## 2022-07-21 ENCOUNTER — HOSPITAL ENCOUNTER (OUTPATIENT)
Dept: RADIOLOGY | Facility: CLINIC | Age: 62
Discharge: HOME | End: 2022-07-21
Attending: FAMILY MEDICINE
Payer: COMMERCIAL

## 2022-07-21 DIAGNOSIS — Z12.31 OTHER SCREENING MAMMOGRAM: ICD-10-CM

## 2022-07-21 PROCEDURE — 77067 MAMMO DIGITAL SCREENING BILAT WITH TOMO: ICD-10-PCS | Mod: 26,,, | Performed by: RADIOLOGY

## 2022-07-21 PROCEDURE — 77063 BREAST TOMOSYNTHESIS BI: CPT | Mod: 26,,, | Performed by: RADIOLOGY

## 2022-07-21 PROCEDURE — 77067 SCR MAMMO BI INCL CAD: CPT | Mod: 26,,, | Performed by: RADIOLOGY

## 2022-07-21 PROCEDURE — 77063 MAMMO DIGITAL SCREENING BILAT WITH TOMO: ICD-10-PCS | Mod: 26,,, | Performed by: RADIOLOGY

## 2022-07-21 PROCEDURE — 77067 SCR MAMMO BI INCL CAD: CPT | Mod: TC,PO

## 2022-07-21 PROCEDURE — 77063 BREAST TOMOSYNTHESIS BI: CPT | Mod: TC,PO

## 2022-07-22 ENCOUNTER — PATIENT MESSAGE (OUTPATIENT)
Dept: GASTROENTEROLOGY | Facility: CLINIC | Age: 62
End: 2022-07-22
Payer: COMMERCIAL

## 2022-09-14 ENCOUNTER — PATIENT MESSAGE (OUTPATIENT)
Dept: FAMILY MEDICINE | Facility: CLINIC | Age: 62
End: 2022-09-14
Payer: COMMERCIAL

## 2022-09-29 ENCOUNTER — PATIENT MESSAGE (OUTPATIENT)
Dept: FAMILY MEDICINE | Facility: CLINIC | Age: 62
End: 2022-09-29
Payer: COMMERCIAL

## 2023-03-27 ENCOUNTER — PATIENT MESSAGE (OUTPATIENT)
Dept: FAMILY MEDICINE | Facility: CLINIC | Age: 63
End: 2023-03-27
Payer: COMMERCIAL

## 2023-03-27 DIAGNOSIS — Z71.84 TRAVEL ADVICE ENCOUNTER: Primary | ICD-10-CM

## 2023-03-27 NOTE — TELEPHONE ENCOUNTER
It is recommended that she be up-to-date on TD, flu, MMR, polio, shingles, COVID-19, hepatitis a with hepatitis-B a recommendation for anyone under 60 and suggested for over 60.  She also will need malaria prophylaxis and depending on what other country she may passed through before getting there she may need typhoid.      Unfortunately she should have ask this question six months ago.  It is highly unlikely she will be able to get the required vaccines in two weeks.  I have put in a consult for the Infectious Disease travel Clinic.  She also will need health insurance while traveling, most [place] insurance companies do not cover health concerns while traveling.

## 2023-03-28 ENCOUNTER — CLINICAL SUPPORT (OUTPATIENT)
Dept: INFECTIOUS DISEASES | Facility: CLINIC | Age: 63
End: 2023-03-28

## 2023-03-28 ENCOUNTER — OFFICE VISIT (OUTPATIENT)
Dept: INFECTIOUS DISEASES | Facility: CLINIC | Age: 63
End: 2023-03-28
Payer: COMMERCIAL

## 2023-03-28 VITALS
HEART RATE: 84 BPM | HEIGHT: 63 IN | SYSTOLIC BLOOD PRESSURE: 156 MMHG | WEIGHT: 274.69 LBS | BODY MASS INDEX: 48.67 KG/M2 | DIASTOLIC BLOOD PRESSURE: 81 MMHG

## 2023-03-28 DIAGNOSIS — Z71.85 IMMUNIZATION COUNSELING: ICD-10-CM

## 2023-03-28 DIAGNOSIS — Z71.84 TRAVEL ADVICE ENCOUNTER: Primary | ICD-10-CM

## 2023-03-28 DIAGNOSIS — Z71.84 TRAVEL ADVICE ENCOUNTER: ICD-10-CM

## 2023-03-28 PROCEDURE — 99999 PR PBB SHADOW E&M-EST. PATIENT-LVL I: ICD-10-PCS | Mod: PBBFAC,,,

## 2023-03-28 PROCEDURE — 99999 PR PBB SHADOW E&M-EST. PATIENT-LVL I: CPT | Mod: PBBFAC,,,

## 2023-03-28 PROCEDURE — 3077F SYST BP >= 140 MM HG: CPT | Mod: CPTII,S$GLB,, | Performed by: STUDENT IN AN ORGANIZED HEALTH CARE EDUCATION/TRAINING PROGRAM

## 2023-03-28 PROCEDURE — 3079F PR MOST RECENT DIASTOLIC BLOOD PRESSURE 80-89 MM HG: ICD-10-PCS | Mod: CPTII,S$GLB,, | Performed by: STUDENT IN AN ORGANIZED HEALTH CARE EDUCATION/TRAINING PROGRAM

## 2023-03-28 PROCEDURE — 3077F PR MOST RECENT SYSTOLIC BLOOD PRESSURE >= 140 MM HG: ICD-10-PCS | Mod: CPTII,S$GLB,, | Performed by: STUDENT IN AN ORGANIZED HEALTH CARE EDUCATION/TRAINING PROGRAM

## 2023-03-28 PROCEDURE — 99999 PR PBB SHADOW E&M-EST. PATIENT-LVL III: ICD-10-PCS | Mod: PBBFAC,,, | Performed by: STUDENT IN AN ORGANIZED HEALTH CARE EDUCATION/TRAINING PROGRAM

## 2023-03-28 PROCEDURE — 3008F PR BODY MASS INDEX (BMI) DOCUMENTED: ICD-10-PCS | Mod: CPTII,S$GLB,, | Performed by: STUDENT IN AN ORGANIZED HEALTH CARE EDUCATION/TRAINING PROGRAM

## 2023-03-28 PROCEDURE — 1159F PR MEDICATION LIST DOCUMENTED IN MEDICAL RECORD: ICD-10-PCS | Mod: CPTII,S$GLB,, | Performed by: STUDENT IN AN ORGANIZED HEALTH CARE EDUCATION/TRAINING PROGRAM

## 2023-03-28 PROCEDURE — 99402 PREV MED CNSL INDIV APPRX 30: CPT | Mod: S$GLB,,, | Performed by: STUDENT IN AN ORGANIZED HEALTH CARE EDUCATION/TRAINING PROGRAM

## 2023-03-28 PROCEDURE — 90691 TYPHOID VICPS VACCINE IM: ICD-10-PCS | Mod: S$GLB,,, | Performed by: INTERNAL MEDICINE

## 2023-03-28 PROCEDURE — 90691 TYPHOID VACCINE IM: CPT | Mod: S$GLB,,, | Performed by: INTERNAL MEDICINE

## 2023-03-28 PROCEDURE — 90471 TYPHOID VICPS VACCINE IM: ICD-10-PCS | Mod: S$GLB,,, | Performed by: INTERNAL MEDICINE

## 2023-03-28 PROCEDURE — 1159F MED LIST DOCD IN RCRD: CPT | Mod: CPTII,S$GLB,, | Performed by: STUDENT IN AN ORGANIZED HEALTH CARE EDUCATION/TRAINING PROGRAM

## 2023-03-28 PROCEDURE — 3079F DIAST BP 80-89 MM HG: CPT | Mod: CPTII,S$GLB,, | Performed by: STUDENT IN AN ORGANIZED HEALTH CARE EDUCATION/TRAINING PROGRAM

## 2023-03-28 PROCEDURE — 99999 PR PBB SHADOW E&M-EST. PATIENT-LVL III: CPT | Mod: PBBFAC,,, | Performed by: STUDENT IN AN ORGANIZED HEALTH CARE EDUCATION/TRAINING PROGRAM

## 2023-03-28 PROCEDURE — 90471 IMMUNIZATION ADMIN: CPT | Mod: S$GLB,,, | Performed by: INTERNAL MEDICINE

## 2023-03-28 PROCEDURE — 99402 PR PREVENT COUNSEL,INDIV,30 MIN: ICD-10-PCS | Mod: S$GLB,,, | Performed by: STUDENT IN AN ORGANIZED HEALTH CARE EDUCATION/TRAINING PROGRAM

## 2023-03-28 PROCEDURE — 3008F BODY MASS INDEX DOCD: CPT | Mod: CPTII,S$GLB,, | Performed by: STUDENT IN AN ORGANIZED HEALTH CARE EDUCATION/TRAINING PROGRAM

## 2023-03-28 RX ORDER — ATOVAQUONE AND PROGUANIL HYDROCHLORIDE 250; 100 MG/1; MG/1
1 TABLET, FILM COATED ORAL DAILY
Qty: 13 TABLET | Refills: 0 | Status: SHIPPED | OUTPATIENT
Start: 2023-03-28 | End: 2023-04-10

## 2023-03-28 RX ORDER — CICLOPIROX 7.7 MG/G
GEL TOPICAL
COMMUNITY
Start: 2023-01-19 | End: 2024-03-25

## 2023-03-28 RX ORDER — AZITHROMYCIN 500 MG/1
TABLET, FILM COATED ORAL
Qty: 4 TABLET | Refills: 0 | Status: SHIPPED | OUTPATIENT
Start: 2023-03-28 | End: 2023-07-19

## 2023-03-28 NOTE — PROGRESS NOTES
Infectious Disease Clinic - Travel Consult    Amanda Clark presents today for pretravel consultation.      Traveling to:  [place]  Date arrival:  04/11/23  Duration of stay: 7  Areas in country: urban    Accommodations: hotel  Purpose of travel: vacation  Countries previously traveled to: [place], [place], [place]  Hx of travel related infections?: None  Hx of travel related immunizations?: None    Patient denies recent fevers, chills or infectious illnesses.   Denies hx of splenctomy.   No history of immunosuppression.     Past Medical History:   Diagnosis Date    Anxiety     Arthritis     Back pain     Cancer     precancerous skin lesion    Depression     Ganglion cyst of wrist, left 07/08/2019    Dr Alves     Herniated disc     L-4     Hyperlipidemia     Knee pain     Prediabetes 6/30/2022    Psoriasis        Current Outpatient Medications on File Prior to Visit   Medication Sig Dispense Refill    FLUoxetine 40 MG capsule Take 1 capsule (40 mg total) by mouth once daily. 90 capsule 3    timolol maleate 0.5% (TIMOPTIC) 0.5 % Drop Place 1 drop into both eyes 2 (two) times daily.       No current facility-administered medications on file prior to visit.       Review of patient's allergies indicates:   Allergen Reactions    No known drug allergies        Immunization History   Administered Date(s) Administered    COVID-19, MRNA, LN-S, PF (MODERNA FULL 0.5 ML DOSE) 03/22/2021, 04/19/2021    Influenza 10/01/2012, 11/20/2013, 10/01/2014, 11/21/2017, 10/24/2018, 10/13/2020    Influenza (FLUAD) - Quadrivalent - Adjuvanted - PF *Preferred* (65+) 11/28/2021    Influenza (FLUBLOK) - Quadrivalent - Recombinant - PF *Preferred* (egg allergy) 11/28/2021    Influenza - High Dose - PF (65 years and older) 10/25/2018    Influenza - Quadrivalent 10/20/2019    Influenza - Quadrivalent - MDCK - PF 10/04/2020    Influenza - Quadrivalent - PF *Preferred* (6 months and older) 11/02/2016, 11/29/2017, 11/08/2018, 11/14/2018     Tdap 10/21/2016    Zoster Recombinant 11/28/2021, 06/20/2022       Review of Systems   Constitutional:  Negative for chills, diaphoresis, fever and weight loss.   HENT:  Negative for congestion, sinus pain and sore throat.    Eyes:  Negative for pain and discharge.   Respiratory:  Negative for cough, sputum production and shortness of breath.    Cardiovascular:  Negative for chest pain and leg swelling.   Gastrointestinal:  Negative for abdominal pain, diarrhea, nausea and vomiting.   Genitourinary:  Negative for dysuria and hematuria.   Musculoskeletal:  Negative for joint pain.   Skin:  Negative for rash.   Neurological:  Negative for focal weakness and headaches.   Endo/Heme/Allergies:  Negative for environmental allergies.   Psychiatric/Behavioral:  Negative for substance abuse. The patient is not nervous/anxious.      Physical Exam  Vitals reviewed.   Constitutional:       General: She is not in acute distress.     Appearance: Normal appearance. She is not ill-appearing, toxic-appearing or diaphoretic.   HENT:      Head: Normocephalic and atraumatic.      Nose: No congestion or rhinorrhea.      Mouth/Throat:      Mouth: Mucous membranes are moist.      Pharynx: Oropharynx is clear.   Eyes:      General: No scleral icterus.     Conjunctiva/sclera: Conjunctivae normal.   Cardiovascular:      Rate and Rhythm: Normal rate.      Heart sounds: Normal heart sounds.   Pulmonary:      Effort: Pulmonary effort is normal.      Breath sounds: Normal breath sounds.   Abdominal:      General: Abdomen is flat. Bowel sounds are normal. There is no distension.      Palpations: Abdomen is soft.      Tenderness: There is no abdominal tenderness.   Musculoskeletal:         General: No swelling or tenderness. Normal range of motion.      Cervical back: Normal range of motion. No tenderness.      Right lower leg: No edema.      Left lower leg: No edema.   Lymphadenopathy:      Cervical: No cervical adenopathy.   Skin:     General:  Skin is warm and dry.      Coloration: Skin is not jaundiced.      Findings: No erythema or rash.   Neurological:      Mental Status: She is alert and oriented to person, place, and time. Mental status is at baseline.   Psychiatric:         Behavior: Behavior normal.         Thought Content: Thought content normal.         ASSESSMENT:   Problem Noted   Immunization Counseling 3/28/2023   Travel Advice Encounter 3/28/2023       PLAN:  The Patient was provided with an extensive travel guidance packet which provides travel information specific to the patients itinerary.     The patient's immunization history was reviewed and, based on the patient's itinerary, immunizations were ordered.      Infectious Disease Risks:      Mosquito Borne pathogens:  Reviewed basic mosquito avoidance precautions including wearing long sleeve clothing and insect repellant. The patient was instructed to purchase insect repellent containing DEET or [place] and apply according to repellent label instructions.     Malarone (# 13 tablets) was prescribed for malaria prophylaxis and possible side effects were reviewed.    -- eRx sent to pt requested pharm: Atovaquone-proguanil 250-100 mg PO qdaily, start 2 days before expected exposure and end 7 days after last day of exposure. Disp# 13  [NOTE: Malarone: C/I in pts w/ APCKD, and other CKD w/ CrCl < 30 ml/min]     Counseled patient on:   - Zika precautions and to abstain or practice safe sex for a period of 3 months (males) and 8 weeks (females) upon return.   - Yellow Fever Vaccine (live): not indicated/recommended for pt travel.  precautions, indications as well as benefits vs. Risks of immunization; which includes but is not limited to adverse events ranging from mild (headache, myalgias/arthralgias, fever, fatigue), to more severe and potentially fatal reactions, such as neurotrophic and viscerotrophic reactions w/ multiorgan failure. Those at relative increased risk for such severe  reactions: >61yo, and immunocompromised.   [place]e Encephalitis Vaccine (inactivated): not indicated / recommended.  --precautions, indications as well as benefits vs. Risks of immunization.    Food Borne pathogens: Reviewed basic hand, food and water sanitation precautions.  Patient instructed to take hand  on their trip.     Vaccinations:      --Typhoid vaccine: Recommended, pt agreed. Completed today in clinic w/o issue.      -- HepA: Recommended, pt agreed; provided written Rx.    Traveler's Diarrhea: The patient was instructed to purchase Imodium over the counter to take in case diarrhea (without blood or fever) develops.   -- Azithromycin was ordered for treatment if severe or bloody diarrhea develops and the patient was instructed on use and possible side effects.   - eRX sent to pt requested pharm: Azithromycin 500 mg PO x 3 days, Disp#4    -- Also, counseled patient on recommended use of imodium for mild-mod TD: Antimotility agents sometimes induce prolonged constipation even at low doses, and they can lead to a bloated, uncomfortable feeling if taken for moderately severe infections without taking an antibiotic as well. Use of these agents should be discontinued if symptoms last more than 48 hours. Bowel immobilizers are not recommended in travelers with fever or presence of blood in the stool.      Blood Borne Pathogens: HepB immunity status: Unknown;    Hep-B records or serologies not available for review.   --provided pt w/ written Rx            Immunization Hx: Reviewed.     Immunizations Up-to-date / Immune  Due / Recommended  Ordered    Hep-A    (0, 6 mo)   IMM24  [] UTD  [x] Due / rec  Provided written Rx  []  ordered   Dose#1 today   Dose#2 @6mo    Hep-B  [id number]   (0, 1mo)  [] UTD  [x] Due / rec  Provided written Rx  []  ordered   Dose#1 today   Dose#2 @1mo    T-Dap   IMM61  [x] UTD  [] Due / rec  []  ordered    Influenza   [id number]   BLI968 (65+),   high dose  [x] UTD  [] Due / rec  []   ordered    COVID-19   Primary series plus x2 boost  [] UTD  [x] Due / rec; but not available in office; referred to pharm to complete        Pneum*   (65+, 19i)   IMM78 (PCV13)   IMM53 (PPSV23)   MFB884 (PCV20)  [] UTD  [] Due / recommended            [x]Not indicated    []  ordered    Shingrex  (50+, 19i)   IMM88   (0, 2-6 mo)  [x] UTD  [] Due / recommended         []Not indicated    []  ordered  Dose#1 today  Dose#2 @2-6mo    Typhoid   IMM64   (Boost Q2yr)  [] UTD  [x] Due / recommended  [x]  ordered    Ylw Fever    (live)   IMM68  [] UTD  [] Due / recommended      [x]Not indicated    []  ordered    Japanese Encephalitis     [] UTD  [] Due / recommended     [x] Not indicated / Negligible risk per travel itinerary    []  ordered    Polio-IPV   IMM51  [] UTD  [] Due / recommended      [x]Not indicated    []  ordered    Meningo^   JML838   QBD920 (conj)  [] UTD  [] Due / recommended      [x]Not indicated  []  ordered     *Prior pneumo vax:   If PPSV23 --> PCV20 1yr later  (complete)  If PCV13 (any age) --> PPSV23 or PCV20 1yr later (complete)  *None prior: PCV20  //   PCV15/13 --> PCV23 (1yr later;or 8wks if immunocompromised)  *Immunocompromised:   PCV13 (any age)--8wks-> PPSV23 (<65) --5yrs-> PPSV23 (<65)--5yrs-> PPSV23 (>65)  ^Meningococcal (quadrivalent): 1 or 2 doses (in adolescence; for travelers in endemic areas for A, C, Y, W).  Polio: (4-dose series completed in childhood before ~5yo); For travel ([place]), may give one-time booster dose of polio vaccine (IPV).    -- The patient was encouraged to contact us about any problems that may develop after immunization and possible side effects were reviewed.     Environmental risks:   The patient's medical history was reviewed and the patient was counseled on:  Precautions to minimize risk/exposure to crime and motor vehicle accidents  Precautions against development of DVT during flight        -- Sit in an aisle seat if possible to allow ease of getting up and  around.        -- Walk airplane aisle periodically when its safe to do so.        -- Do calf muscle exercises once an hour while seated to promote muscular                           pumping action of venous blood back into the central circulation.       -- Stay well hydrated, avoid excess consumption of coffee and alcohol, which have a diuretic effect and may contribute to dehydration.       -- Below-knee graduated compression stockings   Precautions to prevent exposure to rabies and seek treatment for possible exposures  Precautions against sun exposure  Personal and travel safety; bring COVID vaccine passport as proof of vaccination.  Dietary precautions    The patient was instructed to contact us if problems develop after travel.    I have spent 30 minutes with the patient, all of which was face to face with greater than 50% spent counseling.    No orders of the defined types were placed in this encounter.

## 2023-03-28 NOTE — PROGRESS NOTES
Patient received Typhoid Vaccine IM in right arm, pt tolerated well and left clinic NAD after observation.

## 2023-04-10 ENCOUNTER — TELEPHONE (OUTPATIENT)
Dept: FAMILY MEDICINE | Facility: CLINIC | Age: 63
End: 2023-04-10
Payer: COMMERCIAL

## 2023-04-11 ENCOUNTER — PATIENT MESSAGE (OUTPATIENT)
Dept: FAMILY MEDICINE | Facility: CLINIC | Age: 63
End: 2023-04-11
Payer: COMMERCIAL

## 2023-04-11 DIAGNOSIS — E66.01 MORBID OBESITY WITH BMI OF 45.0-49.9, ADULT: Primary | ICD-10-CM

## 2023-04-12 ENCOUNTER — PATIENT MESSAGE (OUTPATIENT)
Dept: ADMINISTRATIVE | Facility: HOSPITAL | Age: 63
End: 2023-04-12
Payer: COMMERCIAL

## 2023-04-19 ENCOUNTER — VITALS (OUTPATIENT)
Dept: FAMILY MEDICINE | Facility: CLINIC | Age: 63
End: 2023-04-19
Payer: COMMERCIAL

## 2023-04-19 ENCOUNTER — DOCUMENTATION ONLY (OUTPATIENT)
Dept: FAMILY MEDICINE | Facility: CLINIC | Age: 63
End: 2023-04-19
Payer: COMMERCIAL

## 2023-04-19 VITALS — BODY MASS INDEX: 47.46 KG/M2 | WEIGHT: 267.88 LBS

## 2023-04-19 RX ORDER — SEMAGLUTIDE 0.68 MG/ML
0.25 INJECTION, SOLUTION SUBCUTANEOUS
Qty: 1.5 ML | Refills: 1 | Status: SHIPPED | OUTPATIENT
Start: 2023-04-19 | End: 2023-05-11 | Stop reason: SDUPTHER

## 2023-05-11 DIAGNOSIS — E66.01 MORBID OBESITY WITH BMI OF 45.0-49.9, ADULT: ICD-10-CM

## 2023-05-11 RX ORDER — SEMAGLUTIDE 0.68 MG/ML
0.5 INJECTION, SOLUTION SUBCUTANEOUS
Qty: 3 ML | Refills: 1 | Status: SHIPPED | OUTPATIENT
Start: 2023-05-11 | End: 2023-06-09 | Stop reason: DRUGHIGH

## 2023-05-11 NOTE — TELEPHONE ENCOUNTER
Please see patient comment below:    Patient Comment: Ive had no nausea with taking tge .25 dose - Im ready to move up to .50       LR--4-19-23        LOV--7-18-22       FOV--7-19-23

## 2023-06-08 ENCOUNTER — PATIENT MESSAGE (OUTPATIENT)
Dept: FAMILY MEDICINE | Facility: CLINIC | Age: 63
End: 2023-06-08
Payer: COMMERCIAL

## 2023-06-09 RX ORDER — SEMAGLUTIDE 1.34 MG/ML
1 INJECTION, SOLUTION SUBCUTANEOUS
Qty: 3 ML | Refills: 0 | Status: SHIPPED | OUTPATIENT
Start: 2023-06-09 | End: 2023-07-03

## 2023-07-19 ENCOUNTER — OFFICE VISIT (OUTPATIENT)
Dept: FAMILY MEDICINE | Facility: CLINIC | Age: 63
End: 2023-07-19
Attending: FAMILY MEDICINE
Payer: COMMERCIAL

## 2023-07-19 ENCOUNTER — PATIENT MESSAGE (OUTPATIENT)
Dept: GASTROENTEROLOGY | Facility: CLINIC | Age: 63
End: 2023-07-19
Payer: COMMERCIAL

## 2023-07-19 ENCOUNTER — LAB VISIT (OUTPATIENT)
Dept: LAB | Facility: HOSPITAL | Age: 63
End: 2023-07-19
Attending: FAMILY MEDICINE
Payer: COMMERCIAL

## 2023-07-19 VITALS
DIASTOLIC BLOOD PRESSURE: 74 MMHG | RESPIRATION RATE: 17 BRPM | OXYGEN SATURATION: 95 % | BODY MASS INDEX: 45.08 KG/M2 | TEMPERATURE: 98 F | HEIGHT: 63 IN | HEART RATE: 74 BPM | SYSTOLIC BLOOD PRESSURE: 120 MMHG | WEIGHT: 254.44 LBS

## 2023-07-19 DIAGNOSIS — M19.90 ARTHRITIS: ICD-10-CM

## 2023-07-19 DIAGNOSIS — E78.5 HYPERLIPIDEMIA, UNSPECIFIED HYPERLIPIDEMIA TYPE: ICD-10-CM

## 2023-07-19 DIAGNOSIS — Z00.00 ENCOUNTER FOR PREVENTIVE HEALTH EXAMINATION: ICD-10-CM

## 2023-07-19 DIAGNOSIS — E66.01 MORBID OBESITY WITH BMI OF 45.0-49.9, ADULT: ICD-10-CM

## 2023-07-19 DIAGNOSIS — F32.5 MAJOR DEPRESSION IN REMISSION: ICD-10-CM

## 2023-07-19 DIAGNOSIS — Z12.31 SCREENING MAMMOGRAM FOR BREAST CANCER: ICD-10-CM

## 2023-07-19 DIAGNOSIS — F41.9 ANXIETY: ICD-10-CM

## 2023-07-19 DIAGNOSIS — Z00.00 ENCOUNTER FOR PREVENTIVE HEALTH EXAMINATION: Primary | ICD-10-CM

## 2023-07-19 DIAGNOSIS — R73.03 PREDIABETES: ICD-10-CM

## 2023-07-19 DIAGNOSIS — Z12.11 COLON CANCER SCREENING: ICD-10-CM

## 2023-07-19 LAB
ALBUMIN SERPL BCP-MCNC: 3.8 G/DL (ref 3.5–5.2)
ALP SERPL-CCNC: 77 U/L (ref 55–135)
ALT SERPL W/O P-5'-P-CCNC: 18 U/L (ref 10–44)
ANION GAP SERPL CALC-SCNC: 10 MMOL/L (ref 8–16)
AST SERPL-CCNC: 19 U/L (ref 10–40)
BASOPHILS # BLD AUTO: 0.06 K/UL (ref 0–0.2)
BASOPHILS NFR BLD: 0.9 % (ref 0–1.9)
BILIRUB SERPL-MCNC: 0.5 MG/DL (ref 0.1–1)
BUN SERPL-MCNC: 12 MG/DL (ref 8–23)
CALCIUM SERPL-MCNC: 9.4 MG/DL (ref 8.7–10.5)
CHLORIDE SERPL-SCNC: 105 MMOL/L (ref 95–110)
CHOLEST SERPL-MCNC: 159 MG/DL (ref 120–199)
CHOLEST/HDLC SERPL: 3.8 {RATIO} (ref 2–5)
CO2 SERPL-SCNC: 25 MMOL/L (ref 23–29)
CREAT SERPL-MCNC: 0.8 MG/DL (ref 0.5–1.4)
DIFFERENTIAL METHOD: NORMAL
EOSINOPHIL # BLD AUTO: 0.3 K/UL (ref 0–0.5)
EOSINOPHIL NFR BLD: 4.1 % (ref 0–8)
ERYTHROCYTE [DISTWIDTH] IN BLOOD BY AUTOMATED COUNT: 13.7 % (ref 11.5–14.5)
EST. GFR  (NO RACE VARIABLE): >60 ML/MIN/1.73 M^2
ESTIMATED AVG GLUCOSE: 120 MG/DL (ref 68–131)
GLUCOSE SERPL-MCNC: 99 MG/DL (ref 70–110)
HBA1C MFR BLD: 5.8 % (ref 4.5–6.2)
HCT VFR BLD AUTO: 40.8 % (ref 37–48.5)
HDLC SERPL-MCNC: 42 MG/DL (ref 40–75)
HDLC SERPL: 26.4 % (ref 20–50)
HGB BLD-MCNC: 13.2 G/DL (ref 12–16)
IMM GRANULOCYTES # BLD AUTO: 0.02 K/UL (ref 0–0.04)
IMM GRANULOCYTES NFR BLD AUTO: 0.3 % (ref 0–0.5)
LDLC SERPL CALC-MCNC: 98 MG/DL (ref 63–159)
LYMPHOCYTES # BLD AUTO: 1.6 K/UL (ref 1–4.8)
LYMPHOCYTES NFR BLD: 24.3 % (ref 18–48)
MCH RBC QN AUTO: 30.3 PG (ref 27–31)
MCHC RBC AUTO-ENTMCNC: 32.4 G/DL (ref 32–36)
MCV RBC AUTO: 94 FL (ref 82–98)
MONOCYTES # BLD AUTO: 0.6 K/UL (ref 0.3–1)
MONOCYTES NFR BLD: 8.4 % (ref 4–15)
NEUTROPHILS # BLD AUTO: 4.2 K/UL (ref 1.8–7.7)
NEUTROPHILS NFR BLD: 62 % (ref 38–73)
NONHDLC SERPL-MCNC: 117 MG/DL
NRBC BLD-RTO: 0 /100 WBC
PLATELET # BLD AUTO: 273 K/UL (ref 150–450)
PMV BLD AUTO: 9.9 FL (ref 9.2–12.9)
POTASSIUM SERPL-SCNC: 4.4 MMOL/L (ref 3.5–5.1)
PROT SERPL-MCNC: 7 G/DL (ref 6–8.4)
RBC # BLD AUTO: 4.35 M/UL (ref 4–5.4)
SODIUM SERPL-SCNC: 140 MMOL/L (ref 136–145)
TRIGL SERPL-MCNC: 95 MG/DL (ref 30–150)
WBC # BLD AUTO: 6.76 K/UL (ref 3.9–12.7)

## 2023-07-19 PROCEDURE — 99396 PREV VISIT EST AGE 40-64: CPT | Mod: S$GLB,,, | Performed by: FAMILY MEDICINE

## 2023-07-19 PROCEDURE — 1159F PR MEDICATION LIST DOCUMENTED IN MEDICAL RECORD: ICD-10-PCS | Mod: CPTII,S$GLB,, | Performed by: FAMILY MEDICINE

## 2023-07-19 PROCEDURE — 3078F DIAST BP <80 MM HG: CPT | Mod: CPTII,S$GLB,, | Performed by: FAMILY MEDICINE

## 2023-07-19 PROCEDURE — 3078F PR MOST RECENT DIASTOLIC BLOOD PRESSURE < 80 MM HG: ICD-10-PCS | Mod: CPTII,S$GLB,, | Performed by: FAMILY MEDICINE

## 2023-07-19 PROCEDURE — 80053 COMPREHEN METABOLIC PANEL: CPT | Performed by: FAMILY MEDICINE

## 2023-07-19 PROCEDURE — 99396 PR PREVENTIVE VISIT,EST,40-64: ICD-10-PCS | Mod: S$GLB,,, | Performed by: FAMILY MEDICINE

## 2023-07-19 PROCEDURE — 36415 COLL VENOUS BLD VENIPUNCTURE: CPT | Performed by: FAMILY MEDICINE

## 2023-07-19 PROCEDURE — 1160F PR REVIEW ALL MEDS BY PRESCRIBER/CLIN PHARMACIST DOCUMENTED: ICD-10-PCS | Mod: CPTII,S$GLB,, | Performed by: FAMILY MEDICINE

## 2023-07-19 PROCEDURE — 99999 PR PBB SHADOW E&M-EST. PATIENT-LVL V: ICD-10-PCS | Mod: PBBFAC,,, | Performed by: FAMILY MEDICINE

## 2023-07-19 PROCEDURE — 3008F PR BODY MASS INDEX (BMI) DOCUMENTED: ICD-10-PCS | Mod: CPTII,S$GLB,, | Performed by: FAMILY MEDICINE

## 2023-07-19 PROCEDURE — 85025 COMPLETE CBC W/AUTO DIFF WBC: CPT | Performed by: FAMILY MEDICINE

## 2023-07-19 PROCEDURE — 99999 PR PBB SHADOW E&M-EST. PATIENT-LVL V: CPT | Mod: PBBFAC,,, | Performed by: FAMILY MEDICINE

## 2023-07-19 PROCEDURE — 3074F SYST BP LT 130 MM HG: CPT | Mod: CPTII,S$GLB,, | Performed by: FAMILY MEDICINE

## 2023-07-19 PROCEDURE — 3074F PR MOST RECENT SYSTOLIC BLOOD PRESSURE < 130 MM HG: ICD-10-PCS | Mod: CPTII,S$GLB,, | Performed by: FAMILY MEDICINE

## 2023-07-19 PROCEDURE — 1160F RVW MEDS BY RX/DR IN RCRD: CPT | Mod: CPTII,S$GLB,, | Performed by: FAMILY MEDICINE

## 2023-07-19 PROCEDURE — 1159F MED LIST DOCD IN RCRD: CPT | Mod: CPTII,S$GLB,, | Performed by: FAMILY MEDICINE

## 2023-07-19 PROCEDURE — 80061 LIPID PANEL: CPT | Performed by: FAMILY MEDICINE

## 2023-07-19 PROCEDURE — 3008F BODY MASS INDEX DOCD: CPT | Mod: CPTII,S$GLB,, | Performed by: FAMILY MEDICINE

## 2023-07-19 PROCEDURE — 83036 HEMOGLOBIN GLYCOSYLATED A1C: CPT | Performed by: FAMILY MEDICINE

## 2023-07-19 RX ORDER — SEMAGLUTIDE 2.68 MG/ML
2 INJECTION, SOLUTION SUBCUTANEOUS
Qty: 3 ML | Refills: 5 | Status: SHIPPED | OUTPATIENT
Start: 2023-08-03 | End: 2023-12-03

## 2023-07-19 NOTE — PROGRESS NOTES
Subjective:       Patient ID: Amanda Clark is a 63 y.o. female.    Chief Complaint: Annual Exam (Pt states that she is here for her annual exam )    63-year-old female coming in for annual exam.  She is fasting for lab.  She has a history of anxiety and major depression in remission and is no longer taking Prozac.  She has a history of psoriasis which she states is inactive and she feels that her USP had a positive affect.  She has hyperlipidemia on diet control, prediabetes on diet control, morbid obesity taking Ozempic for weight loss and down 28.5 lb from her last physical a year ago.  She has osteoarthritis of multiple joints with bilateral total knee replacements and chronic back pain status post epidural steroid injections and a laminotomy.  She has glaucoma on Timoptic.  She is due for a colonoscopy and her mammogram will be due this coming Friday.  She is been having some problems with chronic rhinitis using Zyrtec daily gives her some relief but later on in the day she has symptoms and wants to know what else she may take.    Past Medical History:  No date: Anxiety  No date: Arthritis  No date: Back pain  No date: Cancer      Comment:  precancerous skin lesion  No date: Depression  2019: Ganglion cyst of wrist, left      Comment:  Dr Alves   No date: Herniated disc      Comment:  L-4   No date: Hyperlipidemia  No date: Knee pain  2022: Prediabetes  No date: Psoriasis    Past Surgical History:  No date: ADENOIDECTOMY  No date: arthroscopic left knee  No date: BACK injections      Comment:   & 2020: CARPAL TUNNEL RELEASE; Right  No date:  SECTION  1/25/10: COLONOSCOPY      Comment:  Dr. Coronado, 10 year recheck  2022: EYE SURGERY; Bilateral      Comment:  bilateral cataract surgery 2016: JOINT REPLACEMENT      Comment:  right knee   2015: JOINT REPLACEMENT      Comment:  left knee   2015: LAMINOTOMY      Comment:  Dr Lazo  No date:  STEROID INJECTION KNEE  No date: TONSILLECTOMY  No date: vocal cord nodule removal    Review of patient's family history indicates:    Social History    Tobacco Use      Smoking status: Former        Types: Cigarettes        Quit date: 1988        Years since quittin.4      Smokeless tobacco: Never    Alcohol use: Yes      Comment: sometimes    Drug use: No    Current Outpatient Medications on File Prior to Visit:  ciclopirox 0.77 % Gel, Apply topically., Disp: , Rfl:   semaglutide (OZEMPIC) 1 mg/dose (4 mg/3 mL), INJECT 1MG INTO THE SKIN EVERY 7 DAYS, Disp: 3 mL, Rfl: 0  timolol maleate 0.5% (TIMOPTIC) 0.5 % Drop, Place 1 drop into both eyes 2 (two) times daily., Disp: , Rfl:   [DISCONTINUED] azithromycin (ZITHROMAX) 500 MG tablet, First dose may take 1,000mg once orally, or 500mg two doses spaced out within 24hr perior. If symptomatic after 24hr, continue with 500mg orally, once per day for 2 more days., Disp: 4 tablet, Rfl: 0  [DISCONTINUED] FLUoxetine 40 MG capsule, Take 1 capsule (40 mg total) by mouth once daily., Disp: 90 capsule, Rfl: 3    No current facility-administered medications on file prior to visit.        Review of Systems   Constitutional:  Negative for chills, diaphoresis, fatigue, fever and unexpected weight change.   HENT:  Positive for congestion and rhinorrhea. Negative for ear pain, hearing loss, postnasal drip, sinus pressure, sneezing, sore throat, tinnitus and trouble swallowing.    Eyes:  Negative for itching and visual disturbance.   Respiratory:  Negative for cough, chest tightness, shortness of breath and wheezing.    Cardiovascular:  Negative for chest pain, palpitations and leg swelling.   Gastrointestinal:  Negative for abdominal pain, blood in stool, constipation, diarrhea, nausea and vomiting.   Genitourinary:  Negative for dysuria, frequency and hematuria.        She has an appointment with Dr. Berrios for her Pap smear   Musculoskeletal:  Negative for arthralgias,  back pain, joint swelling and myalgias.   Neurological:  Negative for dizziness and headaches.   Hematological:  Negative for adenopathy.   Psychiatric/Behavioral:  Negative for sleep disturbance. The patient is not nervous/anxious.      Objective:      Physical Exam  Vitals and nursing note reviewed.   Constitutional:       General: She is not in acute distress.     Appearance: Normal appearance. She is well-developed. She is obese. She is not ill-appearing, toxic-appearing or diaphoretic.      Comments: Good blood pressure control  Morbid obesity with a BMI of 45.1 but she is down 28.5 lb from her July 18, 2022 physical.     HENT:      Head: Normocephalic and atraumatic.      Right Ear: Tympanic membrane, ear canal and external ear normal. There is no impacted cerumen.      Left Ear: Tympanic membrane, ear canal and external ear normal. There is no impacted cerumen.      Nose: Nose normal. No congestion or rhinorrhea.      Mouth/Throat:      Mouth: Mucous membranes are moist.      Pharynx: Oropharynx is clear. No oropharyngeal exudate or posterior oropharyngeal erythema.   Eyes:      General: No scleral icterus.        Right eye: No discharge.         Left eye: No discharge.      Extraocular Movements: Extraocular movements intact.      Conjunctiva/sclera: Conjunctivae normal.      Pupils: Pupils are equal, round, and reactive to light.   Neck:      Thyroid: No thyromegaly.      Vascular: No carotid bruit or JVD.   Cardiovascular:      Rate and Rhythm: Normal rate and regular rhythm.      Heart sounds: Normal heart sounds. No murmur heard.    No friction rub. No gallop.   Pulmonary:      Effort: Pulmonary effort is normal. No respiratory distress.      Breath sounds: Normal breath sounds. No stridor. No wheezing, rhonchi or rales.   Chest:      Chest wall: No tenderness.   Abdominal:      General: Bowel sounds are normal. There is no distension.      Palpations: Abdomen is soft. There is no mass.      Tenderness:  There is no abdominal tenderness. There is no guarding or rebound.      Hernia: No hernia is present.   Musculoskeletal:         General: No swelling, tenderness or deformity. Normal range of motion.      Cervical back: Normal range of motion and neck supple. No rigidity or tenderness.      Right lower leg: No edema.      Left lower leg: No edema.   Lymphadenopathy:      Cervical: No cervical adenopathy.   Skin:     General: Skin is warm and dry.      Coloration: Skin is not jaundiced or pale.      Findings: No bruising, erythema, lesion or rash.   Neurological:      General: No focal deficit present.      Mental Status: She is alert and oriented to person, place, and time. Mental status is at baseline.      Cranial Nerves: No cranial nerve deficit.      Sensory: No sensory deficit.      Motor: No weakness.      Coordination: Coordination normal.      Deep Tendon Reflexes: Reflexes are normal and symmetric. Reflexes normal.   Psychiatric:         Mood and Affect: Mood normal.         Behavior: Behavior normal.         Thought Content: Thought content normal.         Judgment: Judgment normal.       Assessment:       1. Encounter for preventive health examination    2. Hyperlipidemia, unspecified hyperlipidemia type    3. Prediabetes    4. Arthritis    5. Major depression in remission    6. Anxiety    7. Screening mammogram for breast cancer    8. Colon cancer screening    9. Morbid obesity with BMI of 45.0-49.9, adult        Plan:       1. Encounter for preventive health examination  - Comprehensive Metabolic Panel; Future  - Lipid Panel; Future  - CBC Auto Differential; Future    2. Hyperlipidemia, unspecified hyperlipidemia type  Await lipid panel results  - Comprehensive Metabolic Panel; Future  - Lipid Panel; Future    3. Prediabetes  Await a1c and fasting glucose  - Comprehensive Metabolic Panel; Future  - Hemoglobin A1C; Future    4. Arthritis  Asymptomatic currently    5. Major depression in remission  Off of  Prozac and doing well    6. Anxiety  Asymptomatic    7. Screening mammogram for breast cancer  To be scheduled on or after July 21st  - Mammo Digital Screening Bilat w/ Jigar; Future  - Mammo Digital Screening Bilat w/ Jigar    8. Colon cancer screening  Colonoscopy requested  - Case Request Endoscopy: COLONOSCOPY    9. Morbid obesity with BMI of 45.0-49.9, adult  Patient will finish out her four week run of Ozempic 1 mg weekly and then switch to the 2 mg.  Prescription set to be filled on August 3rd  - semaglutide (OZEMPIC) 2 mg/dose (8 mg/3 mL) PnIj; Inject 2 mg into the skin every 7 days.  Dispense: 3 mL; Refill: 5

## 2023-08-15 ENCOUNTER — TELEPHONE (OUTPATIENT)
Dept: GASTROENTEROLOGY | Facility: CLINIC | Age: 63
End: 2023-08-15
Payer: COMMERCIAL

## 2023-08-15 NOTE — TELEPHONE ENCOUNTER
----- Message from Caleb Giordano sent at 8/15/2023  3:57 PM CDT -----  Regarding: return call  Type:  Patient Returning Call    Who Called:Pt    Who Left Message for Patient:Leslie    Does the patient know what this is regarding?:     Would the patient rather a call back or a response via [facility]? Call back    Best Call Back Number:[phone]      Additional Information:   Please advise -- Thank you

## 2023-08-15 NOTE — TELEPHONE ENCOUNTER
----- Message from Bernardino Lopes sent at 8/15/2023 10:15 AM CDT -----  Type:  Patient Returning Call    Who Called:  Patient  Who Left Message for Patient:  Leslie  Does the patient know what this is regarding?:  Scheduling colonoscopy  Best Call Back Number:  [phone]  Additional Information:

## 2023-08-15 NOTE — TELEPHONE ENCOUNTER
Call placed to Ms. Patel in regards to scheduling a screening colonoscopy. She denies any GI issues at this time. Pt advised she will need to hold her Ozempic 1 weeks prior to her procedure. She verbalized understanding. Colonoscopy scheduled for 10/26 at 1030 arriving for 0930. She accepted. Prep instructions reviewed and sent to pt portal. No further issues noted.d

## 2023-08-22 ENCOUNTER — HOSPITAL ENCOUNTER (OUTPATIENT)
Dept: RADIOLOGY | Facility: HOSPITAL | Age: 63
Discharge: HOME | End: 2023-08-22
Attending: FAMILY MEDICINE
Payer: COMMERCIAL

## 2023-08-22 PROCEDURE — 77067 SCR MAMMO BI INCL CAD: CPT | Mod: TC,PO

## 2023-09-07 ENCOUNTER — PATIENT MESSAGE (OUTPATIENT)
Dept: GASTROENTEROLOGY | Facility: CLINIC | Age: 63
End: 2023-09-07
Payer: COMMERCIAL

## 2023-09-13 ENCOUNTER — TELEPHONE (OUTPATIENT)
Dept: GASTROENTEROLOGY | Facility: CLINIC | Age: 63
End: 2023-09-13
Payer: COMMERCIAL

## 2023-09-13 NOTE — TELEPHONE ENCOUNTER
----- Message from Floresita Wade sent at 9/13/2023 10:17 AM CDT -----  Type: Needs Medical Advice  Who Called:  pt  Symptoms (please be specific):  pt said she need to speak to the office said she got a msg that she need to reschedule her colonoscopy on 10/26--please call and advise  Best Call Back Number: [phone] (home)     Additional Information: thank you

## 2023-09-13 NOTE — TELEPHONE ENCOUNTER
Call placed to Ms. Patel in regards to message received. Colonoscopy rescheduled to 9/29 at 1:30p. She accepted. Instructed not to take her Ozempic on 9/21. She verbalized understanding. No further issues noted.

## 2023-09-20 ENCOUNTER — PATIENT MESSAGE (OUTPATIENT)
Dept: GASTROENTEROLOGY | Facility: CLINIC | Age: 63
End: 2023-09-20
Payer: COMMERCIAL

## 2023-09-27 ENCOUNTER — ANESTHESIA (OUTPATIENT)
Dept: ENDOSCOPY | Facility: HOSPITAL | Age: 63
End: 2023-09-27
Payer: COMMERCIAL

## 2023-09-27 ENCOUNTER — HOSPITAL ENCOUNTER (OUTPATIENT)
Facility: HOSPITAL | Age: 63
Discharge: HOME | End: 2023-09-27
Attending: INTERNAL MEDICINE | Admitting: INTERNAL MEDICINE
Payer: COMMERCIAL

## 2023-09-27 ENCOUNTER — ANESTHESIA EVENT (OUTPATIENT)
Dept: ENDOSCOPY | Facility: HOSPITAL | Age: 63
End: 2023-09-27
Payer: COMMERCIAL

## 2023-09-27 VITALS
OXYGEN SATURATION: 98 % | RESPIRATION RATE: 19 BRPM | DIASTOLIC BLOOD PRESSURE: 61 MMHG | BODY MASS INDEX: 43.05 KG/M2 | HEIGHT: 63 IN | HEART RATE: 69 BPM | WEIGHT: 243 LBS | TEMPERATURE: 98 F | SYSTOLIC BLOOD PRESSURE: 126 MMHG

## 2023-09-27 DIAGNOSIS — Z12.11 SCREEN FOR COLON CANCER: ICD-10-CM

## 2023-09-27 PROCEDURE — 37000008 HC ANESTHESIA 1ST 15 MINUTES: Performed by: INTERNAL MEDICINE

## 2023-09-27 PROCEDURE — 63600175 PHARM REV CODE 636 W HCPCS: Performed by: STUDENT IN AN ORGANIZED HEALTH CARE EDUCATION/TRAINING PROGRAM

## 2023-09-27 PROCEDURE — 45385 COLONOSCOPY W/LESION REMOVAL: CPT | Mod: 33,,, | Performed by: INTERNAL MEDICINE

## 2023-09-27 PROCEDURE — D9220A PRA ANESTHESIA: ICD-10-PCS | Mod: 33,CRNA,, | Performed by: STUDENT IN AN ORGANIZED HEALTH CARE EDUCATION/TRAINING PROGRAM

## 2023-09-27 PROCEDURE — 45385 PR COLONOSCOPY,REMV LESN,SNARE: ICD-10-PCS | Mod: 33,,, | Performed by: INTERNAL MEDICINE

## 2023-09-27 PROCEDURE — D9220A PRA ANESTHESIA: Mod: 33,ANES,, | Performed by: ANESTHESIOLOGY

## 2023-09-27 PROCEDURE — 27201089 HC SNARE, DISP (ANY): Performed by: INTERNAL MEDICINE

## 2023-09-27 PROCEDURE — D9220A PRA ANESTHESIA: Mod: 33,CRNA,, | Performed by: STUDENT IN AN ORGANIZED HEALTH CARE EDUCATION/TRAINING PROGRAM

## 2023-09-27 PROCEDURE — 25000003 PHARM REV CODE 250: Performed by: INTERNAL MEDICINE

## 2023-09-27 PROCEDURE — 45385 COLONOSCOPY W/LESION REMOVAL: CPT | Mod: PT | Performed by: INTERNAL MEDICINE

## 2023-09-27 PROCEDURE — D9220A PRA ANESTHESIA: ICD-10-PCS | Mod: 33,ANES,, | Performed by: ANESTHESIOLOGY

## 2023-09-27 PROCEDURE — 37000009 HC ANESTHESIA EA ADD 15 MINS: Performed by: INTERNAL MEDICINE

## 2023-09-27 PROCEDURE — 25000003 PHARM REV CODE 250: Performed by: STUDENT IN AN ORGANIZED HEALTH CARE EDUCATION/TRAINING PROGRAM

## 2023-09-27 RX ORDER — PROPOFOL 10 MG/ML
VIAL (ML) INTRAVENOUS
Status: DISCONTINUED | OUTPATIENT
Start: 2023-09-27 | End: 2023-09-27

## 2023-09-27 RX ORDER — SODIUM CHLORIDE 9 MG/ML
INJECTION, SOLUTION INTRAVENOUS CONTINUOUS
Status: DISCONTINUED | OUTPATIENT
Start: 2023-09-27 | End: 2023-09-27 | Stop reason: HOSPADM

## 2023-09-27 RX ORDER — LIDOCAINE HYDROCHLORIDE 20 MG/ML
INJECTION INTRAVENOUS
Status: DISCONTINUED | OUTPATIENT
Start: 2023-09-27 | End: 2023-09-27

## 2023-09-27 RX ADMIN — PROPOFOL 50 MG: 10 INJECTION, EMULSION INTRAVENOUS at 11:09

## 2023-09-27 RX ADMIN — SODIUM CHLORIDE: 0.9 INJECTION, SOLUTION INTRAVENOUS at 11:09

## 2023-09-27 RX ADMIN — LIDOCAINE HYDROCHLORIDE 80 MG: 20 INJECTION, SOLUTION INTRAVENOUS at 11:09

## 2023-09-27 NOTE — H&P
[facility] Note    CC: History of colon polyps     HPI 63 y.o. female presents for surveillance colonoscopy due to history of colon polyps. Last colonoscopy 15 years ago    Past Medical History:   Diagnosis Date    Anxiety     Arthritis     Back pain     Cancer     precancerous skin lesion    Depression     Ganglion cyst of wrist, left 07/08/2019    Dr Alves     Herniated disc     L-4     Hyperlipidemia     Knee pain     Prediabetes 6/30/2022    Psoriasis        Allergies and Medications reviewed     Review of Systems  General ROS: negative for - chills, fever or weight loss  Cardiovascular ROS: no chest pain or dyspnea on exertion  Gastrointestinal ROS: no blood in stool    Physical Examination  LMP 07/04/2011   General appearance: alert, cooperative, no distress  HENT: Normocephalic, atraumatic, neck symmetrical, no nasal discharge, sclera anicteric   Lungs: clear to auscultation bilaterally, symmetric chest wall expansion bilaterally  Heart: regular rate and rhythm without rub; no displacement of the PMI   Abdomen: soft  Extremities: extremities symmetric; no clubbing, cyanosis, or edema        Labs:  Lab Results   Component Value Date    WBC 6.76 07/19/2023    HGB 13.2 07/19/2023    HCT 40.8 07/19/2023    MCV 94 07/19/2023     07/19/2023             Assessment:   63 y.o. female presents for colonoscopy    Plan:  -Proceed to colonoscopy     Obdulia Valentine MD  [facility]  [address], [address]  [place] [address]  Office: [phone]  Fax: [phone]

## 2023-09-27 NOTE — ANESTHESIA PREPROCEDURE EVALUATION
09/27/2023  Amanda Clark is a 63 y.o., female.      Pre-op Assessment    I have reviewed the NPO Status.   I have reviewed the Medications.     Review of Systems  Anesthesia Hx:  No problems with previous Anesthesia    Cardiovascular:   Exercise tolerance: good    Pulmonary:   Sleep Apnea    Hepatic/GI:   Bowel Prep.    Neurological:  Neurology Normal    Endocrine:  Morbid Obesity / BMI > 40  Psych:   Psychiatric History          Physical Exam  General: Well nourished    Airway:  Mallampati: II   Mouth Opening: Normal  TM Distance: Normal  Tongue: Normal  Neck ROM: Normal ROM    Dental:  Intact    Chest/Lungs:  Clear to auscultation, Normal Respiratory Rate        Anesthesia Plan  Type of Anesthesia, risks & benefits discussed:    Anesthesia Type: Gen Natural Airway  Intra-op Monitoring Plan: Standard ASA Monitors  Induction:  IV  Informed Consent: Informed consent signed with the Patient and all parties understand the risks and agree with anesthesia plan.  All questions answered.   ASA Score: 3    Ready For Surgery From Anesthesia Perspective.     .

## 2023-09-27 NOTE — PLAN OF CARE
Patient awake, alert, and oriented.  No complaints of pain; abdomen soft and tender.  Patient passing flatus without difficulty.  Vital signs stable.  Patient tolerated po fluids well.  Dr. Valentine spoke with patient and family member prior to discharge.  Patient and family verbalize understanding of all discharge instructions given.  Patient discharged to home accompanied by family

## 2023-09-27 NOTE — TRANSFER OF CARE
"Anesthesia Transfer of Care Note    Patient: Amanda Clark    Procedure(s) Performed: Procedure(s) (LRB):  COLONOSCOPY (N/A)    Patient location: GI    Anesthesia Type: general    Transport from OR: Transported from OR on room air with adequate spontaneous ventilation    Post pain: adequate analgesia    Post assessment: no apparent anesthetic complications    Post vital signs: stable    Level of consciousness: awake, lethargic and responds to stimulation    Nausea/Vomiting: no nausea/vomiting    Complications: none    Transfer of care protocol was followed      Last vitals:   Visit Vitals  /63 (BP Location: Left arm, Patient Position: Lying)   Pulse 74   Temp 37 °C (98.6 °F) (Temporal)   Resp 16   Ht 5' 3" (1.6 m)   Wt 110.2 kg (243 lb)   LMP 07/04/2011   SpO2 96%   Breastfeeding No   BMI 43.05 kg/m²     "

## 2023-09-27 NOTE — PROVATION PATIENT INSTRUCTIONS
Discharge Summary/Instructions after an Endoscopic Procedure  Patient Name: Amanda Clark  Patient MRN: [id number]  Patient YOB: 1960 Wednesday, September 27, 2023  Obdulia Valentine MD  Dear patient,  As a result of recent federal legislation (The Federal Cures Act), you may   receive lab or pathology results from your procedure in your [facility]   account before your physician is able to contact you. Your physician or   their representative will relay the results to you with their   recommendations at their soonest availability.  Thank you,  RESTRICTIONS:  During your procedure today, you received medications for sedation.  These   medications may affect your judgment, balance and coordination.  Therefore,   for 24 hours, you have the following restrictions:   - DO NOT drive a car, operate machinery, make legal/financial decisions,   sign important papers or drink alcohol.    ACTIVITY:  Today: no heavy lifting, straining or running due to procedural   sedation/anesthesia.  The following day: return to full activity including work.  DIET:  Eat and drink normally unless instructed otherwise.     TREATMENT FOR COMMON SIDE EFFECTS:  - Mild abdominal pain, nausea, belching, bloating or excessive gas:  rest,   eat lightly and use a heating pad.  - Sore Throat: treat with throat lozenges and/or gargle with warm salt   water.  - Because air was used during the procedure, expelling large amounts of air   from your rectum or belching is normal.  - If a bowel prep was taken, you may not have a bowel movement for 1-3 days.    This is normal.  SYMPTOMS TO WATCH FOR AND REPORT TO YOUR PHYSICIAN:  1. Abdominal pain or bloating, other than gas cramps.  2. Chest pain.  3. Back pain.  4. Signs of infection such as: chills or fever occurring within 24 hours   after the procedure.  5. Rectal bleeding, which would show as bright red, maroon, or black stools.   (A tablespoon of blood from the rectum is not serious,  especially if   hemorrhoids are present.)  6. Vomiting.  7. Weakness or dizziness.  GO DIRECTLY TO THE NEAREST EMERGENCY ROOM IF YOU HAVE ANY OF THE FOLLOWING:      Difficulty breathing              Chills and/or fever over 101 F   Persistent vomiting and/or vomiting blood   Severe abdominal pain   Severe chest pain   Black, tarry stools   Bleeding- more than one tablespoon   Any other symptom or condition that you feel may need urgent attention  Your doctor recommends these additional instructions:  If any biopsies were taken, your doctors clinic will contact you in 1 to 2   weeks with any results.  - Discharge patient to home (with escort).   - Patient has a contact number available for emergencies.  The signs and   symptoms of potential delayed complications were discussed with the   patient.  Return to normal activities tomorrow.  Written discharge   instructions were provided to the patient.   - Resume previous diet.   - Continue present medications.   - Await pathology results.   - Repeat colonoscopy in 5-10 years for surveillance based on pathology   results.   - Return to my office PRN.  For questions, problems or results please call your physician - Obdulia Valentine MD at Work:  [phone].  [facility], EMERGENCY ROOM PHONE NUMBER: [phone]  IF A COMPLICATION OR EMERGENCY SITUATION ARISES AND YOU ARE UNABLE TO REACH   YOUR PHYSICIAN - GO DIRECTLY TO THE EMERGENCY ROOM.  Obdulia Valentine MD  9/27/2023 11:44:06 AM  This report has been verified and signed electronically.  Dear patient,  As a result of recent federal legislation (The Federal Cures Act), you may   receive lab or pathology results from your procedure in your [facility]   account before your physician is able to contact you. Your physician or   their representative will relay the results to you with their   recommendations at their soonest availability.  Thank you,  PROVATION

## 2023-09-28 NOTE — ANESTHESIA POSTPROCEDURE EVALUATION
Anesthesia Post Evaluation    Patient: Amanda Clark    Procedure(s) Performed: Procedure(s) (LRB):  COLONOSCOPY (N/A)    Final Anesthesia Type: general      Patient location during evaluation: PACU  Patient participation: Yes- Able to Participate  Level of consciousness: awake and alert and oriented  Post-procedure vital signs: reviewed and stable  Pain management: adequate  Airway patency: patent    PONV status at discharge: No PONV  Anesthetic complications: no      Cardiovascular status: blood pressure returned to baseline  Respiratory status: unassisted, spontaneous ventilation and room air  Hydration status: euvolemic  Follow-up not needed.          Vitals Value Taken Time   /61 09/27/23 1206   Temp 36.7 °C (98.1 °F) 09/27/23 1150   Pulse 69 09/27/23 1206   Resp 19 09/27/23 1206   SpO2 98 % 09/27/23 1206         Event Time   Out of Recovery 12:24:46         Pain/Isidro Score: Isidro Score: 10 (9/27/2023 12:06 PM)

## 2024-01-11 ENCOUNTER — PATIENT MESSAGE (OUTPATIENT)
Dept: FAMILY MEDICINE | Facility: CLINIC | Age: 64
End: 2024-01-11
Payer: COMMERCIAL

## 2024-01-11 DIAGNOSIS — Z78.0 MENOPAUSE: Primary | ICD-10-CM

## 2024-01-11 DIAGNOSIS — E66.01 MORBID OBESITY WITH BMI OF 45.0-49.9, ADULT: ICD-10-CM

## 2024-01-12 ENCOUNTER — TELEPHONE (OUTPATIENT)
Dept: FAMILY MEDICINE | Facility: CLINIC | Age: 64
End: 2024-01-12
Payer: COMMERCIAL

## 2024-01-23 ENCOUNTER — HOSPITAL ENCOUNTER (OUTPATIENT)
Dept: RADIOLOGY | Facility: HOSPITAL | Age: 64
Discharge: HOME | End: 2024-01-23
Attending: SPECIALIST
Payer: MEDICARE

## 2024-01-23 DIAGNOSIS — Z78.0 MENOPAUSE: ICD-10-CM

## 2024-01-23 PROCEDURE — 77080 DXA BONE DENSITY AXIAL: CPT | Mod: TC,PO

## 2024-03-20 ENCOUNTER — PATIENT MESSAGE (OUTPATIENT)
Dept: FAMILY MEDICINE | Facility: CLINIC | Age: 64
End: 2024-03-20
Payer: COMMERCIAL

## 2024-03-20 ENCOUNTER — TELEPHONE (OUTPATIENT)
Dept: PRIMARY CARE CLINIC | Facility: CLINIC | Age: 64
End: 2024-03-20
Payer: COMMERCIAL

## 2024-03-20 DIAGNOSIS — E66.01 MORBID OBESITY WITH BMI OF 45.0-49.9, ADULT: Primary | ICD-10-CM

## 2024-03-20 RX ORDER — TIRZEPATIDE 10 MG/.5ML
10 INJECTION, SOLUTION SUBCUTANEOUS
Qty: 4 PEN | Refills: 1 | Status: SHIPPED | OUTPATIENT
Start: 2024-03-20

## 2024-03-20 NOTE — TELEPHONE ENCOUNTER
Prior authorization for [facility] ans been approved from 02/19/2024 through 09/16/2024.

## 2024-03-25 ENCOUNTER — OFFICE VISIT (OUTPATIENT)
Dept: DERMATOLOGY | Facility: CLINIC | Age: 64
End: 2024-03-25
Payer: MEDICARE

## 2024-03-25 VITALS — WEIGHT: 240.31 LBS | BODY MASS INDEX: 42.58 KG/M2 | HEIGHT: 63 IN

## 2024-03-25 DIAGNOSIS — L82.1 SEBORRHEIC KERATOSES: ICD-10-CM

## 2024-03-25 DIAGNOSIS — D48.5 NEOPLASM OF UNCERTAIN BEHAVIOR OF SKIN: Primary | ICD-10-CM

## 2024-03-25 DIAGNOSIS — Z12.83 SKIN CANCER SCREENING: ICD-10-CM

## 2024-03-25 DIAGNOSIS — L81.4 SOLAR LENTIGO: ICD-10-CM

## 2024-03-25 DIAGNOSIS — L57.0 ACTINIC KERATOSES: ICD-10-CM

## 2024-03-25 DIAGNOSIS — B35.1 ONYCHOMYCOSIS: ICD-10-CM

## 2024-03-25 PROCEDURE — 17000 DESTRUCT PREMALG LESION: CPT | Mod: AQ,XS,S$GLB, | Performed by: DERMATOLOGY

## 2024-03-25 PROCEDURE — 17003 DESTRUCT PREMALG LES 2-14: CPT | Mod: AQ,S$GLB,, | Performed by: DERMATOLOGY

## 2024-03-25 PROCEDURE — 11102 TANGNTL BX SKIN SINGLE LES: CPT | Mod: AQ,S$GLB,, | Performed by: DERMATOLOGY

## 2024-03-25 PROCEDURE — 88305 TISSUE EXAM BY PATHOLOGIST: CPT | Performed by: PATHOLOGY

## 2024-03-25 PROCEDURE — 88305 TISSUE EXAM BY PATHOLOGIST: CPT | Mod: 26,,, | Performed by: PATHOLOGY

## 2024-03-25 PROCEDURE — 99214 OFFICE O/P EST MOD 30 MIN: CPT | Mod: 25,AQ,S$GLB, | Performed by: DERMATOLOGY

## 2024-03-25 RX ORDER — FLUCONAZOLE 200 MG/1
TABLET ORAL
Qty: 12 TABLET | Refills: 1 | Status: SHIPPED | OUTPATIENT
Start: 2024-03-25

## 2024-03-25 RX ORDER — FLUCONA/IBUPROF/ITRACON/TERBIN 4-2-1-4 %
SOLUTION, NON-ORAL TOPICAL
Qty: 15 G | Refills: 5 | Status: SHIPPED | OUTPATIENT
Start: 2024-03-25

## 2024-03-25 NOTE — PATIENT INSTRUCTIONS
Shave Biopsy Wound Care    Your doctor has performed a shave biopsy today.  A band aid and vaseline ointment has been placed over the site.  This should remain in place for 24 hours.  It is recommended that you keep the area dry for the first 24 hours.  After 24 hours, you may remove the band aid and wash the area with warm soap and water and apply Vaseline jelly.  Many patients prefer to use Neosporin or Bacitracin ointment.  This is acceptable; however, know that you can develop an allergy to this medication even if you have used it safely for years.  It is important to keep the area moist.  Letting it dry out and get air slows healing time, and will worsen the scar.  Band aid is optional after first 24 hours.      If you notice increasing redness, tenderness, pain, or yellow drainage at the biopsy site, please notify your doctor.  These are signs of an infection.    If your biopsy site is bleeding, apply firm pressure for 15 minutes straight.  Repeat for another 15 minutes, if it is still bleeding.   If the surgical site continues to bleed, then please contact your doctor.       [facility] - DERMATOLOGY  [address], [address]  [place] [address]  Dept: [phone]  Dept Fax: [phone]      CRYOSURGERY      Your doctor has used a method called cryosurgery to treat your skin condition. Cryosurgery refers to the use of very cold substances to treat a variety of skin conditions such as warts, pre-skin cancers, molluscum contagiosum, sun spots, and several benign growths. The substance we use in cryosurgery is liquid nitrogen and is so cold (-195 degrees Celsius) that is burns when administered.     Following treatment in the office, the skin may immediately burn and become red. You may find the area around the lesion is affected as well. It is sometimes necessary to treat not only the lesion, but a small area of the surrounding normal skin to achieve a good response.     A  blister, and even a blood filled blister, may form after treatment.   This is a normal response. If the blister is painful, it is acceptable to sterilize a needle and with rubbing alcohol and gently pop the blister. It is important that you gently wash the area with soap and warm water as the blister fluid may contain wart virus if a wart was treated. Do no remove the roof of the blister.     The area treated can take anywhere from 1-3 weeks to heal. Healing time depends on the kind skin lesion treated, the location, and how aggressively the lesion was treated. It is recommended that the areas treated are covered with Vaseline or bacitracin ointment and a band-aid. If a band-aid is not practical, just ointment applied several times per day will do. Keeping these areas moist will speed the healing time.    Treatment with liquid nitrogen can leave a scar. In dark skin, it may be a light or dark scar, in light skin it may be a white or pink scar. These will generally fade with time, but may never go away completely.     If you have any concerns after your treatment, please feel free to call the office.         [facility] - DERMATOLOGY  [address], [address]  [place] [address]  Dept: [phone]  Dept Fax: [phone]

## 2024-03-25 NOTE — PROGRESS NOTES
"  Subjective:      Patient ID:  Amanda Clark is a 64 y.o. female who presents for   Chief Complaint   Patient presents with    Skin Check     TBSE     Mole     Upper back    Dry Skin     Around nose, and lips    Fungus     Toenails     LOV 5/19/21 - NUB, AK, nevi, lentigo, angioma      Diagnosis Skin, intramammary skin, shave biopsy:  -LICHENOID KERATOSIS, EXCORIATED, see comment    Patient here today for TBSE     C/o mole on upper back x months  Very dry, and itchy, no treatment    C/o Dry/redness to skin x1 year  Around lips and nose, no treatment    C/o fungus on toenails x2 years  "Nothing helps"     Derm hx:  Denies phx of NMSC   Denies fhx of MM      Current Outpatient Medications:   ·  timolol maleate 0.5% (TIMOPTIC) 0.5 % Drop, Place 1 drop into both eyes 2 (two) times daily., Disp: , Rfl:   ·  ciclopirox 0.77 % Gel, Apply topically., Disp: , Rfl:   ·  tirzepatide, weight loss, (ZEPBOUND) 10 mg/0.5 mL PnIj, Inject 10 mg into the skin every 7 days., Disp: 4 Pen, Rfl: 1          Review of Systems   Constitutional:  Negative for fever, chills and fatigue.   Respiratory:  Negative for cough and shortness of breath.    Skin:  Positive for daily sunscreen use and activity-related sunscreen use. Negative for itching, rash, dry skin, recent sunburn and dry lips.   Hematologic/Lymphatic: Does not bruise/bleed easily.       Objective:   Physical Exam   Constitutional: She appears well-developed and well-nourished. No distress.   Neurological: She is alert and oriented to person, place, and time. She is not disoriented.   Psychiatric: She has a normal mood and affect.   Skin:   Areas Examined (abnormalities noted in diagram):   Scalp / Hair Palpated and Inspected  Head / Face Inspection Performed  Neck Inspection Performed  Chest / Axilla Inspection Performed  Abdomen Inspection Performed  Genitals / Buttocks / Groin Inspection Performed  Back Inspection Performed  RUE Inspected  LUE Inspection Performed  RLE " Inspected  LLE Inspection Performed  Nails and Digits Inspection Performed                     Diagram Legend     Erythematous scaling macule/papule c/w actinic keratosis       Vascular papule c/w angioma      Pigmented verrucoid papule/plaque c/w seborrheic keratosis      Yellow umbilicated papule c/w sebaceous hyperplasia      Irregularly shaped tan macule c/w lentigo     1-2 mm smooth white papules consistent with Milia      Movable subcutaneous cyst with punctum c/w epidermal inclusion cyst      Subcutaneous movable cyst c/w pilar cyst      Firm pink to brown papule c/w dermatofibroma      Pedunculated fleshy papule(s) c/w skin tag(s)      Evenly pigmented macule c/w junctional nevus     Mildly variegated pigmented, slightly irregular-bordered macule c/w mildly atypical nevus      Flesh colored to evenly pigmented papule c/w intradermal nevus       Pink pearly papule/plaque c/w basal cell carcinoma      Erythematous hyperkeratotic cursted plaque c/w SCC      Surgical scar with no sign of skin cancer recurrence      Open and closed comedones      Inflammatory papules and pustules      Verrucoid papule consistent consistent with wart     Erythematous eczematous patches and plaques     Dystrophic onycholytic nail with subungual debris c/w onychomycosis     Umbilicated papule    Erythematous-base heme-crusted tan verrucoid plaque consistent with inflamed seborrheic keratosis     Erythematous Silvery Scaling Plaque c/w Psoriasis     See annotation   Latest Reference Range & Units 07/19/23 09:42   ALP 55 - 135 U/L 77   PROTEIN TOTAL 6.0 - 8.4 g/dL 7.0   Albumin 3.5 - 5.2 g/dL 3.8   BILIRUBIN TOTAL 0.1 - 1.0 mg/dL 0.5   AST 10 - 40 U/L 19   ALT 10 - 44 U/L 18             Assessment / Plan:      Pathology Orders:       Normal Orders This Visit    Specimen to Pathology, Dermatology     Comments:    Number of Specimens:->1  ------------------------->-------------------------  Spec 1 Procedure:->Biopsy  Spec 1 Clinical  Impression:->SCC vs VV vs other  Spec 1 Source:->left cheek    Questions:    Procedure Type: Dermatology and skin neoplasms    Number of Specimens: 1    ------------------------: -------------------------    Spec 1 Procedure: Biopsy    Spec 1 Clinical Impression: SCC vs VV vs other    Spec 1 Source: left cheek    Release to patient:           Neoplasm of uncertain behavior of skin  -     Specimen to Pathology, Dermatology  Shave biopsy procedure note:    Shave biopsy performed after verbal consent including risk of infection, scar, recurrence, need for additional treatment of site. Area prepped with alcohol, anesthetized with approximately 1.0cc of 1% lidocaine with epinephrine. Lesional tissue shaved with razor blade. Hemostasis achieved with application of aluminum chloride followed by hyfrecation. No complications. Dressing applied. Wound care explained.    Onychomycosis, R great toe  -     oiumefy-blaskux-wvpznrz-terbin (DIFMETIOXRIME) 4-2-1-4 % Soln; Apply to diseased nail once daily  Dispense: 15 g; Refill: 5  -     fluconazole (DIFLUCAN) 200 MG Tab; Take 1 tab PO qweek.  Dispense: 12 tablet; Refill: 1  Frustrating to patient, no change with topical ciclopirox  Does not wish to be on daily oral terbinafine  Discussed the low cure rate/risk of recurrence of oral antifungals for onychomycosis. Trial of weekly fluco with compounded topicals    Counseled Diflucan can have side affects such as change in taste, diarrhea, headache, nausea, these are not urgent, please report if bothersome. If a rash or feeling faint or having palpations please seek urgent care.      Actinic keratoses  Cryosurgery Procedure Note    Verbal consent from the patient is obtained and the patient is aware of the precancerous quality and need for treatment of these lesions. Liquid nitrogen cryosurgery is applied to the 7 actinic keratoses, as detailed in the physical exam, to produce a freeze injury. The patient is aware that blisters may  form and is instructed on wound care with gentle cleansing and use of vaseline ointment to keep moist until healed. The patient is supplied a handout on cryosurgery and is instructed to call if lesions do not completely resolve.    Seborrheic keratoses  These are benign inherited growths without a malignant potential. Reassurance given to patient. No treatment is necessary.     Solar lentigo  This is a benign hyperpigmented sun induced lesion. Daily sun protection will reduce the number of new lesions. Treatment of these benign lesions are considered cosmetic.    Skin cancer screening  Total body skin examination performed today including at least 12 points as noted in physical examination. No lesions suspicious for malignancy noted.    Patient instructed in importance in daily broad spectrum sun protection of at least spf 30. Mineral sunscreen ingredients preferred (Zinc +/- Titanium) and can be found OTC.   Recommend Elta MD for daily use on face and neck.  Patient encouraged to wear hat for all outdoor exposure.   Also discussed sun avoidance and use of protective clothing.             Follow up in about 3 months (around 6/25/2024).

## 2024-03-28 LAB
FINAL PATHOLOGIC DIAGNOSIS: NORMAL
GROSS: NORMAL
Lab: NORMAL
MICROSCOPIC EXAM: NORMAL

## 2024-06-18 ENCOUNTER — PATIENT OUTREACH (OUTPATIENT)
Dept: ADMINISTRATIVE | Facility: HOSPITAL | Age: 64
End: 2024-06-18
Payer: COMMERCIAL

## 2024-06-18 DIAGNOSIS — Z12.31 ENCOUNTER FOR SCREENING MAMMOGRAM FOR MALIGNANT NEOPLASM OF BREAST: Primary | ICD-10-CM

## 2024-06-18 NOTE — PROGRESS NOTES
Population Health Chart Review & Patient Outreach Details      Additional [facility] Notes:      CAMPAIGN- Preventative Care Screening         Updates Requested / Reviewed:      Updated Care Coordination Note, Care Everywhere, and          Health Maintenance Topics Overdue:      VBHM Score: 0     Patient is not due for any topics at this time.    Pneumonia Vaccine                  Health Maintenance Topic(s) Outreach Outcomes & Actions Taken:    Breast Cancer Screening - Outreach Outcomes & Actions Taken  : Mammogram Order Placed and Mammogram Screening Scheduled

## 2024-07-22 ENCOUNTER — OFFICE VISIT (OUTPATIENT)
Dept: FAMILY MEDICINE | Facility: CLINIC | Age: 64
End: 2024-07-22
Attending: FAMILY MEDICINE
Payer: MEDICARE

## 2024-07-22 ENCOUNTER — LAB VISIT (OUTPATIENT)
Dept: LAB | Facility: HOSPITAL | Age: 64
End: 2024-07-22
Attending: FAMILY MEDICINE
Payer: MEDICARE

## 2024-07-22 VITALS
HEIGHT: 63 IN | DIASTOLIC BLOOD PRESSURE: 64 MMHG | OXYGEN SATURATION: 99 % | SYSTOLIC BLOOD PRESSURE: 112 MMHG | HEART RATE: 72 BPM | WEIGHT: 238 LBS | BODY MASS INDEX: 42.17 KG/M2 | TEMPERATURE: 98 F

## 2024-07-22 DIAGNOSIS — L40.9 PSORIASIS: ICD-10-CM

## 2024-07-22 DIAGNOSIS — R25.2 MUSCLE CRAMPS: ICD-10-CM

## 2024-07-22 DIAGNOSIS — M19.90 ARTHRITIS: ICD-10-CM

## 2024-07-22 DIAGNOSIS — E66.01 MORBID OBESITY WITH BMI OF 40.0-44.9, ADULT: ICD-10-CM

## 2024-07-22 DIAGNOSIS — E78.5 HYPERLIPIDEMIA, UNSPECIFIED HYPERLIPIDEMIA TYPE: ICD-10-CM

## 2024-07-22 DIAGNOSIS — J31.0 CHRONIC RHINITIS: ICD-10-CM

## 2024-07-22 DIAGNOSIS — R73.03 PREDIABETES: ICD-10-CM

## 2024-07-22 DIAGNOSIS — R73.03 PREDIABETES: Primary | ICD-10-CM

## 2024-07-22 LAB
ALBUMIN SERPL BCP-MCNC: 3.6 G/DL (ref 3.5–5.2)
ALP SERPL-CCNC: 75 U/L (ref 55–135)
ALT SERPL W/O P-5'-P-CCNC: 16 U/L (ref 10–44)
ANION GAP SERPL CALC-SCNC: 7 MMOL/L (ref 8–16)
AST SERPL-CCNC: 17 U/L (ref 10–40)
BASOPHILS # BLD AUTO: 0.07 K/UL (ref 0–0.2)
BASOPHILS NFR BLD: 1.1 % (ref 0–1.9)
BILIRUB SERPL-MCNC: 0.4 MG/DL (ref 0.1–1)
BUN SERPL-MCNC: 18 MG/DL (ref 8–23)
CALCIUM SERPL-MCNC: 8.8 MG/DL (ref 8.7–10.5)
CHLORIDE SERPL-SCNC: 107 MMOL/L (ref 95–110)
CHOLEST SERPL-MCNC: 170 MG/DL (ref 120–199)
CHOLEST/HDLC SERPL: 3.9 {RATIO} (ref 2–5)
CO2 SERPL-SCNC: 25 MMOL/L (ref 23–29)
CREAT SERPL-MCNC: 0.7 MG/DL (ref 0.5–1.4)
DIFFERENTIAL METHOD BLD: NORMAL
EOSINOPHIL # BLD AUTO: 0.2 K/UL (ref 0–0.5)
EOSINOPHIL NFR BLD: 2.3 % (ref 0–8)
ERYTHROCYTE [DISTWIDTH] IN BLOOD BY AUTOMATED COUNT: 13.8 % (ref 11.5–14.5)
EST. GFR  (NO RACE VARIABLE): >60 ML/MIN/1.73 M^2
ESTIMATED AVG GLUCOSE: 111 MG/DL (ref 68–131)
GLUCOSE SERPL-MCNC: 85 MG/DL (ref 70–110)
HBA1C MFR BLD: 5.5 % (ref 4–5.6)
HCT VFR BLD AUTO: 40.2 % (ref 37–48.5)
HDLC SERPL-MCNC: 44 MG/DL (ref 40–75)
HDLC SERPL: 25.9 % (ref 20–50)
HGB BLD-MCNC: 13 G/DL (ref 12–16)
IMM GRANULOCYTES # BLD AUTO: 0.01 K/UL (ref 0–0.04)
IMM GRANULOCYTES NFR BLD AUTO: 0.2 % (ref 0–0.5)
LDLC SERPL CALC-MCNC: 102.4 MG/DL (ref 63–159)
LYMPHOCYTES # BLD AUTO: 1.4 K/UL (ref 1–4.8)
LYMPHOCYTES NFR BLD: 21.9 % (ref 18–48)
MAGNESIUM SERPL-MCNC: 2 MG/DL (ref 1.6–2.6)
MCH RBC QN AUTO: 30.2 PG (ref 27–31)
MCHC RBC AUTO-ENTMCNC: 32.3 G/DL (ref 32–36)
MCV RBC AUTO: 93 FL (ref 82–98)
MONOCYTES # BLD AUTO: 0.5 K/UL (ref 0.3–1)
MONOCYTES NFR BLD: 8 % (ref 4–15)
NEUTROPHILS # BLD AUTO: 4.3 K/UL (ref 1.8–7.7)
NEUTROPHILS NFR BLD: 66.5 % (ref 38–73)
NONHDLC SERPL-MCNC: 126 MG/DL
NRBC BLD-RTO: 0 /100 WBC
PLATELET # BLD AUTO: 287 K/UL (ref 150–450)
PMV BLD AUTO: 10.3 FL (ref 9.2–12.9)
POTASSIUM SERPL-SCNC: 4.2 MMOL/L (ref 3.5–5.1)
PROT SERPL-MCNC: 6.7 G/DL (ref 6–8.4)
RBC # BLD AUTO: 4.31 M/UL (ref 4–5.4)
SODIUM SERPL-SCNC: 139 MMOL/L (ref 136–145)
TRIGL SERPL-MCNC: 118 MG/DL (ref 30–150)
WBC # BLD AUTO: 6.49 K/UL (ref 3.9–12.7)

## 2024-07-22 PROCEDURE — 99214 OFFICE O/P EST MOD 30 MIN: CPT | Mod: PBBFAC,PN | Performed by: FAMILY MEDICINE

## 2024-07-22 PROCEDURE — 36415 COLL VENOUS BLD VENIPUNCTURE: CPT | Mod: PO | Performed by: FAMILY MEDICINE

## 2024-07-22 PROCEDURE — 83036 HEMOGLOBIN GLYCOSYLATED A1C: CPT | Performed by: FAMILY MEDICINE

## 2024-07-22 PROCEDURE — 83735 ASSAY OF MAGNESIUM: CPT | Performed by: FAMILY MEDICINE

## 2024-07-22 PROCEDURE — 85025 COMPLETE CBC W/AUTO DIFF WBC: CPT | Performed by: FAMILY MEDICINE

## 2024-07-22 PROCEDURE — 80053 COMPREHEN METABOLIC PANEL: CPT | Performed by: FAMILY MEDICINE

## 2024-07-22 PROCEDURE — 80061 LIPID PANEL: CPT | Performed by: FAMILY MEDICINE

## 2024-07-22 PROCEDURE — 99999 PR PBB SHADOW E&M-EST. PATIENT-LVL IV: CPT | Mod: PBBFAC,,, | Performed by: FAMILY MEDICINE

## 2024-07-22 PROCEDURE — 99214 OFFICE O/P EST MOD 30 MIN: CPT | Mod: S$PBB,,, | Performed by: FAMILY MEDICINE

## 2024-07-22 RX ORDER — SEMAGLUTIDE 2.4 MG/.75ML
2.4 INJECTION, SOLUTION SUBCUTANEOUS
COMMUNITY

## 2024-07-22 NOTE — PROGRESS NOTES
Subjective:       Patient ID: Amanda Clark is a 64 y.o. female.    Chief Complaint: Annual Exam    64-year-old female coming in for annual exam and follow-up of multiple medical problems.  She has a history of hyperlipidemia on diet control currently, prediabetes, major depression in remission and no longer taking any psychoactive medications, anxiety also on no medications, arthritis, psoriasis in his currently taking Nizoral for toenail fungus per Dr. Werner.  She is on semaglutide for weight loss having started on Mounjaro but it was much more expensive on her insurance and she is tolerating the semaglutide without any significant problems, slight GI slow down only.  She has dropped 16.5 lb over the last year continuing a slow steady loss.  She is taking a line dance class once a week and spends a good deal of time working in her yd but otherwise is not engaging in any formal exercise.  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    Past Medical History:  No date: Anxiety  No date: Arthritis  No date: Back pain  No date: Cancer      Comment:  precancerous skin lesion  No date: Depression  2019: Ganglion cyst of wrist, left      Comment:  Dr Alves   No date: Herniated disc      Comment:  L-4   No date: Hyperlipidemia  No date: Knee pain  2022: Prediabetes  No date: Psoriasis  02/10/2020: Sleep apnea, unspecified    Past Surgical History:  No date: ADENOIDECTOMY  No date: arthroscopic left knee  No date: BACK injections      Comment:   & 2020: CARPAL TUNNEL RELEASE; Right  No date:  SECTION  1/25/10: COLONOSCOPY      Comment:  Dr. Coronado, 10 year recheck  2023: COLONOSCOPY; N/A      Comment:  Procedure: COLONOSCOPY;  Surgeon: Obdulia Valentine MD;  Location: [facility];  Service: Endoscopy;                  Laterality: N/A;  ppm sent  02/2022: EYE SURGERY; Bilateral      Comment:  bilateral cataract surgery 04/22 02/18/2016: JOINT REPLACEMENT      Comment:  right knee   11/03/2015: JOINT REPLACEMENT      Comment:  left knee   7-: LAMINOTOMY      Comment:  Dr Lazo  No date: STEROID INJECTION KNEE  No date: TONSILLECTOMY  No date: vocal cord nodule removal    Review of patient's family history indicates:  Problem: Heart disease      Relation: Mother          Name:               Age of Onset: (Not Specified)  Problem: Diabetes      Relation: Mother          Name:               Age of Onset: (Not Specified)  Problem: Psoriasis      Relation: Mother          Name:               Age of Onset: (Not Specified)  Problem: Macular degeneration      Relation: Mother          Name:               Age of Onset: (Not Specified)  Problem: Stroke      Relation: Mother          Name:               Age of Onset: (Not Specified)  Problem: Hearing loss      Relation: Mother          Name:               Age of Onset: (Not Specified)  Problem: Arthritis      Relation: Mother          Name:               Age of Onset: (Not Specified)  Problem: Cancer      Relation: Father          Name:               Age of Onset: (Not Specified)              Comment: bone, prostate  Problem: Heart disease      Relation: Sister          Name:               Age of Onset: (Not Specified)  Problem: Diabetes      Relation: Sister          Name:               Age of Onset: (Not Specified)  Problem: Hypertension      Relation: Sister          Name:               Age of Onset: (Not Specified)  Problem: Depression      Relation: Sister          Name:               Age of Onset: (Not Specified)  Problem: Macular degeneration      Relation: Sister          Name:               Age of Onset: (Not Specified)  Problem: No Known Problems      Relation: Daughter          Name:               Age of Onset: (Not Specified)  Problem: Heart disease      Relation:  Maternal Aunt          Name:               Age of Onset: (Not Specified)  Problem: Dementia      Relation: Maternal Aunt          Name:               Age of Onset: (Not Specified)  Problem: Cancer      Relation: Paternal Uncle          Name:               Age of Onset: (Not Specified)              Comment: tumor abdominal  Problem: Heart disease      Relation: Paternal Uncle          Name:               Age of Onset: (Not Specified)  Problem: Dementia      Relation: Paternal Uncle          Name:               Age of Onset: (Not Specified)  Problem: Melanoma      Relation: Neg Hx          Name:               Age of Onset: (Not Specified)  Problem: Lupus      Relation: Neg Hx          Name:               Age of Onset: (Not Specified)  Problem: Eczema      Relation: Neg Hx          Name:               Age of Onset: (Not Specified)    Social History    Tobacco Use      Smoking status: Former        Packs/day: 0.00        Types: Cigarettes        Quit date: 1988        Years since quittin.4      Smokeless tobacco: Never    Alcohol use: Yes      Comment: sometimes    Drug use: No    Current Outpatient Medications on File Prior to Visit:  rktzdcv-lazhfvg-vpdxser-terbin (DIFMETIOXRIME) 4-2-1-4 % Soln, Apply to diseased nail once daily, Disp: 15 g, Rfl: 5  fluconazole (DIFLUCAN) 200 MG Tab, Take 1 tab PO qweek., Disp: 12 tablet, Rfl: 1  semaglutide, weight loss, (WEGOVY) 2.4 mg/0.75 mL PnIj, Inject 2.4 mg into the skin every 7 days., Disp: , Rfl:   timolol maleate 0.5% (TIMOPTIC) 0.5 % Drop, Place 1 drop into both eyes 2 (two) times daily., Disp: , Rfl:   [DISCONTINUED] tirzepatide, weight loss, (ZEPBOUND) 10 mg/0.5 mL PnIj, Inject 10 mg into the skin every 7 days., Disp: 4 Pen, Rfl: 1    No current facility-administered medications on file prior to visit.          Review of Systems   Constitutional:  Negative for chills, diaphoresis, fatigue, fever and unexpected weight change.   HENT:  Positive for congestion  (Taking Zyrtec regularly but is not finding that it works well for her as it used to), postnasal drip, rhinorrhea and sneezing (Most mornings). Negative for ear pain, hearing loss, sinus pressure, sore throat, tinnitus and trouble swallowing.    Eyes:  Negative for itching and visual disturbance.   Respiratory:  Negative for cough, chest tightness, shortness of breath and wheezing.    Cardiovascular:  Negative for chest pain, palpitations and leg swelling.   Gastrointestinal:  Negative for abdominal pain, blood in stool, constipation, diarrhea, nausea and vomiting.   Genitourinary:  Negative for dysuria, frequency and hematuria.   Musculoskeletal:  Positive for myalgias (Frequently getting some muscle cramping that may be due to dehydration and electrolyte disturbance working in the heat.  She typically does not drink any electrolyte solutions only water for rehydration). Negative for arthralgias, back pain and joint swelling.   Neurological:  Negative for dizziness and headaches.   Hematological:  Negative for adenopathy.   Psychiatric/Behavioral:  Negative for sleep disturbance. The patient is not nervous/anxious.        Objective:      Physical Exam  Vitals and nursing note reviewed.   Constitutional:       General: She is not in acute distress.     Appearance: Normal appearance. She is well-developed. She is obese. She is not ill-appearing, toxic-appearing or diaphoretic.      Comments: Good blood pressure control   Normal pulse with regular rhythm   Morbid obesity with a BMI of 42.2 down 16.5 lb from July 19, 2023 and well below the BMI in the 50s that she had previously   HENT:      Head: Normocephalic and atraumatic.      Right Ear: Tympanic membrane, ear canal and external ear normal. There is no impacted cerumen.      Left Ear: Tympanic membrane, ear canal and external ear normal. There is no impacted cerumen.      Nose: Congestion (Mild to moderate turbinate swelling with no erythema) and rhinorrhea  present.      Mouth/Throat:      Mouth: Mucous membranes are moist.      Pharynx: Oropharynx is clear. No oropharyngeal exudate or posterior oropharyngeal erythema.   Eyes:      General: No scleral icterus.        Right eye: No discharge.         Left eye: No discharge.      Extraocular Movements: Extraocular movements intact.      Conjunctiva/sclera: Conjunctivae normal.      Pupils: Pupils are equal, round, and reactive to light.   Neck:      Thyroid: No thyromegaly.      Vascular: No carotid bruit or JVD.   Cardiovascular:      Rate and Rhythm: Normal rate and regular rhythm.      Pulses: Normal pulses.      Heart sounds: Normal heart sounds. No murmur heard.     No friction rub. No gallop.   Pulmonary:      Effort: Pulmonary effort is normal. No respiratory distress.      Breath sounds: Normal breath sounds. No stridor. No wheezing, rhonchi or rales.   Chest:      Chest wall: No tenderness.   Abdominal:      General: Bowel sounds are normal. There is no distension.      Palpations: Abdomen is soft. There is no mass.      Tenderness: There is no abdominal tenderness. There is no guarding or rebound.      Hernia: No hernia is present.   Musculoskeletal:         General: No swelling, tenderness, deformity or signs of injury. Normal range of motion.      Cervical back: Normal range of motion and neck supple. No rigidity or tenderness.      Right lower leg: No edema.      Left lower leg: No edema.   Lymphadenopathy:      Cervical: No cervical adenopathy.   Skin:     General: Skin is warm and dry.      Coloration: Skin is not jaundiced or pale.      Findings: No bruising, erythema, lesion or rash.   Neurological:      General: No focal deficit present.      Mental Status: She is alert and oriented to person, place, and time. Mental status is at baseline.      Cranial Nerves: No cranial nerve deficit.      Sensory: No sensory deficit.      Motor: No weakness.      Coordination: Coordination normal.      Gait: Gait  normal.      Deep Tendon Reflexes: Reflexes are normal and symmetric. Reflexes normal.   Psychiatric:         Mood and Affect: Mood normal.         Behavior: Behavior normal.         Thought Content: Thought content normal.         Judgment: Judgment normal.         Assessment:       1. Prediabetes    2. Hyperlipidemia, unspecified hyperlipidemia type    3. Psoriasis    4. Arthritis    5. Chronic rhinitis    6. Muscle cramps    7. Morbid obesity with BMI of 40.0-44.9, adult        Plan:       1. Prediabetes  Await fasting glucose and A1c level  - Comprehensive Metabolic Panel; Future  - Hemoglobin A1C; Future    2. Hyperlipidemia, unspecified hyperlipidemia type  Await lipid panel  - Comprehensive Metabolic Panel; Future  - Lipid Panel; Future    3. Psoriasis  Asymptomatic    4. Arthritis  Minimally symptomatic not taking any anti inflammatories  - Comprehensive Metabolic Panel; Future  - CBC Auto Differential; Future    5. Chronic rhinitis  Suggest she switch to Allegra from the Zyrtec for awhile she probably has developed some tolerance.  She also can add some Flonase daily and try using saline spray to help remove pollen and contaminants from the upper respiratory tract    6. Muscle cramps  She should combined some electrolyte solution with her rehydration.  - Comprehensive Metabolic Panel; Future  - Magnesium; Future    7. Morbid obesity with BMI of 40.0-44.9, adult  Improving steadily  - Comprehensive Metabolic Panel; Future  - Hemoglobin A1C; Future  - CBC Auto Differential; Future  - Lipid Panel; Future

## 2024-07-30 NOTE — TELEPHONE ENCOUNTER
Refill Routing Note   Medication(s) are not appropriate for processing by [facility] for the following reason(s):        No active prescription written by provider    [facility] action(s):  Defer        Medication Therapy Plan:  ON  3/20/2024.      Appointments  past 12m or future 3m with PCP    Date Provider   Last Visit   2024 Brennon Cruz MD   Next Visit   Visit date not found Brennon Cruz MD   ED visits in past 90 days: 0        Note composed:6:02 PM 2024

## 2024-07-30 NOTE — TELEPHONE ENCOUNTER
No care due was identified.  Health [facility] Embedded Care Due Messages. Reference number: [id number].   7/30/2024 10:40:01 AM CDT

## 2024-07-31 RX ORDER — SEMAGLUTIDE 2.4 MG/.75ML
INJECTION, SOLUTION SUBCUTANEOUS
Qty: 12 ML | Refills: 0 | Status: SHIPPED | OUTPATIENT
Start: 2024-07-31

## 2024-08-02 RX ORDER — SEMAGLUTIDE 2.4 MG/.75ML
INJECTION, SOLUTION SUBCUTANEOUS
Qty: 12 ML | Refills: 0 | Status: CANCELLED | OUTPATIENT
Start: 2024-08-02

## 2024-08-02 NOTE — TELEPHONE ENCOUNTER
No care due was identified.  Health [facility] Embedded Care Due Messages. Reference number: [id number].   8/02/2024 4:32:12 PM CDT

## 2024-08-05 RX ORDER — SEMAGLUTIDE 2.4 MG/.75ML
INJECTION, SOLUTION SUBCUTANEOUS
Qty: 12 ML | Refills: 0 | Status: CANCELLED | OUTPATIENT
Start: 2024-08-05

## 2024-08-23 ENCOUNTER — HOSPITAL ENCOUNTER (OUTPATIENT)
Dept: RADIOLOGY | Facility: HOSPITAL | Age: 64
Discharge: HOME | End: 2024-08-23
Attending: FAMILY MEDICINE
Payer: MEDICARE

## 2024-08-23 VITALS — BODY MASS INDEX: 41.99 KG/M2 | HEIGHT: 63 IN | WEIGHT: 237 LBS

## 2024-08-23 DIAGNOSIS — R92.8 ABNORMALITY OF RIGHT BREAST ON SCREENING MAMMOGRAM: Primary | ICD-10-CM

## 2024-08-23 DIAGNOSIS — Z12.31 ENCOUNTER FOR SCREENING MAMMOGRAM FOR MALIGNANT NEOPLASM OF BREAST: ICD-10-CM

## 2024-08-23 PROCEDURE — 77067 SCR MAMMO BI INCL CAD: CPT | Mod: TC,PO

## 2024-08-23 PROCEDURE — 77067 SCR MAMMO BI INCL CAD: CPT | Mod: 26,,, | Performed by: RADIOLOGY

## 2024-08-23 PROCEDURE — 77063 BREAST TOMOSYNTHESIS BI: CPT | Mod: 26,,, | Performed by: RADIOLOGY

## 2024-08-26 ENCOUNTER — OFFICE VISIT (OUTPATIENT)
Dept: SPINE | Facility: CLINIC | Age: 64
End: 2024-08-26
Payer: MEDICARE

## 2024-08-26 ENCOUNTER — HOSPITAL ENCOUNTER (OUTPATIENT)
Dept: RADIOLOGY | Facility: HOSPITAL | Age: 64
Discharge: HOME | End: 2024-08-26
Attending: PHYSICAL MEDICINE & REHABILITATION
Payer: MEDICARE

## 2024-08-26 VITALS — BODY MASS INDEX: 41.99 KG/M2 | HEIGHT: 63 IN | WEIGHT: 237 LBS

## 2024-08-26 DIAGNOSIS — M54.12 CERVICAL RADICULITIS: ICD-10-CM

## 2024-08-26 DIAGNOSIS — M54.2 CERVICALGIA: Primary | ICD-10-CM

## 2024-08-26 DIAGNOSIS — M54.2 CERVICALGIA: ICD-10-CM

## 2024-08-26 PROCEDURE — 99213 OFFICE O/P EST LOW 20 MIN: CPT | Mod: PBBFAC,PN | Performed by: PHYSICAL MEDICINE & REHABILITATION

## 2024-08-26 PROCEDURE — 96372 THER/PROPH/DIAG INJ SC/IM: CPT | Mod: PBBFAC,PN

## 2024-08-26 PROCEDURE — 99999PBSHW PR PBB SHADOW TECHNICAL ONLY FILED TO HB: Mod: PBBFAC,,,

## 2024-08-26 PROCEDURE — 99999 PR PBB SHADOW E&M-EST. PATIENT-LVL III: CPT | Mod: PBBFAC,,, | Performed by: PHYSICAL MEDICINE & REHABILITATION

## 2024-08-26 PROCEDURE — 99204 OFFICE O/P NEW MOD 45 MIN: CPT | Mod: S$PBB,,, | Performed by: PHYSICAL MEDICINE & REHABILITATION

## 2024-08-26 PROCEDURE — 72050 X-RAY EXAM NECK SPINE 4/5VWS: CPT | Mod: 26,,, | Performed by: RADIOLOGY

## 2024-08-26 PROCEDURE — 72050 X-RAY EXAM NECK SPINE 4/5VWS: CPT | Mod: TC

## 2024-08-26 RX ORDER — KETOROLAC TROMETHAMINE 30 MG/ML
30 INJECTION, SOLUTION INTRAMUSCULAR; INTRAVENOUS ONCE
Status: COMPLETED | OUTPATIENT
Start: 2024-08-26 | End: 2024-08-26

## 2024-08-26 RX ORDER — SULINDAC 200 MG/1
150 TABLET ORAL 2 TIMES DAILY
COMMUNITY

## 2024-08-26 RX ORDER — METHYLPREDNISOLONE ACETATE 40 MG/ML
40 INJECTION, SUSPENSION INTRA-ARTICULAR; INTRALESIONAL; INTRAMUSCULAR; SOFT TISSUE
Status: COMPLETED | OUTPATIENT
Start: 2024-08-26 | End: 2024-08-26

## 2024-08-26 RX ORDER — CYCLOBENZAPRINE HCL 10 MG
10 TABLET ORAL 3 TIMES DAILY PRN
COMMUNITY

## 2024-08-26 RX ORDER — METHYLPREDNISOLONE 4 MG/1
TABLET ORAL
Qty: 1 EACH | Refills: 0 | Status: SHIPPED | OUTPATIENT
Start: 2024-08-26 | End: 2024-09-16

## 2024-08-26 RX ORDER — GABAPENTIN 300 MG/1
300 CAPSULE ORAL NIGHTLY
Qty: 30 CAPSULE | Refills: 1 | Status: SHIPPED | OUTPATIENT
Start: 2024-08-26

## 2024-08-26 RX ADMIN — KETOROLAC TROMETHAMINE 30 MG: 30 INJECTION, SOLUTION INTRAMUSCULAR; INTRAVENOUS at 03:08

## 2024-08-26 RX ADMIN — METHYLPREDNISOLONE ACETATE 40 MG: 40 INJECTION, SUSPENSION INTRA-ARTICULAR; INTRALESIONAL; INTRAMUSCULAR; SOFT TISSUE at 03:08

## 2024-08-26 NOTE — PROGRESS NOTES
SUBJECTIVE:    Patient ID: Amanda Clark is a 64 y.o. female.    Chief Complaint: Neck Pain    This is a 64-year-old woman who sees Dr. Cruz for primary care.  Denies any chronic major medical problems.  No cancer history.  No history of neck problems.  Presents with a 3 day history of spontaneously occurring posterior neck discomfort radiating down the right arm initially to the elbow but over the past day or so all the way into digits 1 through 3 of the right hand.  No difficulty writing or walking.  No bowel or bladder dysfunction fever chills sweats or unexpected weight loss.  She went to an urgent care over the weekend and was placed on Flexeril and Clinoril.  She has been taking Motrin as well.  Current pain level is 7/10 interferes with quality of life in terms of activities of daily living recreation and social activities.  I have no imaging to review.          Past Medical History:   Diagnosis Date    Anxiety     Arthritis     Back pain     Cancer     precancerous skin lesion    Depression     Ganglion cyst of wrist, left 2019    Dr Alves     Herniated disc     L-4     Hyperlipidemia     Knee pain     Prediabetes 2022    Psoriasis     Sleep apnea, unspecified 02/10/2020     Social History     Socioeconomic History    Marital status:    Occupational History     Employer: [employer]   Tobacco Use    Smoking status: Former     Current packs/day: 0.00     Types: Cigarettes     Quit date: 1988     Years since quittin.5    Smokeless tobacco: Never   Substance and Sexual Activity    Alcohol use: Yes     Comment: sometimes    Drug use: No    Sexual activity: Not Currently     Birth control/protection: None     Social Determinants of Health     Financial Resource Strain: Low Risk  (2024)    Overall Financial Resource Strain (CARDIA)     Difficulty of Paying Living Expenses: Not hard at all   Food Insecurity: No Food Insecurity (2024)    Hunger Vital  Sign     Worried About Running Out of Food in the Last Year: Never true     Ran Out of Food in the Last Year: Never true   Transportation Needs: No Transportation Needs (3/23/2024)    PRAPARE - Transportation     Lack of Transportation (Medical): No     Lack of Transportation (Non-Medical): No   Physical Activity: Inactive (2024)    Exercise Vital Sign     Days of Exercise per Week: 0 days     Minutes of Exercise per Session: 30 min   Stress: No Stress Concern Present (2024)    [employer] - Occupational Stress Questionnaire     Feeling of Stress : Not at all   Housing Stability: Low Risk  (3/23/2024)    Housing Stability Vital Sign     Unable to Pay for Housing in the Last Year: No     Number of Places Lived in the Last Year: 1     Unstable Housing in the Last Year: No     Past Surgical History:   Procedure Laterality Date    ADENOIDECTOMY      arthroscopic left knee      BACK injections       & 2013    CARPAL TUNNEL RELEASE Right 2020     SECTION      COLONOSCOPY  1/25/10    Dr. Coronado, 10 year recheck    COLONOSCOPY N/A 2023    Procedure: COLONOSCOPY;  Surgeon: Obdulia Valentine MD;  Location: [facility];  Service: Endoscopy;  Laterality: N/A;  ppm sent    EYE SURGERY Bilateral 2022    bilateral cataract surgery     JOINT REPLACEMENT  2016    right knee     JOINT REPLACEMENT  2015    left knee     LAMINOTOMY  2015    Dr Lazo    STEROID INJECTION KNEE      TONSILLECTOMY      vocal cord nodule removal       Family History   Problem Relation Name Age of Onset    Heart disease Mother      Diabetes Mother      Psoriasis Mother      Macular degeneration Mother      Stroke Mother      Hearing loss Mother      Arthritis Mother      Cancer Father          bone, prostate    Heart disease Sister      Diabetes Sister      Hypertension Sister      Depression Sister      Macular degeneration Sister      No Known Problems Daughter       "Heart disease Maternal Aunt      Dementia Maternal Aunt      Cancer Paternal Uncle          tumor abdominal    Heart disease Paternal Uncle      Dementia Paternal Uncle      Melanoma Neg Hx      Lupus Neg Hx      Eczema Neg Hx       Vitals:    08/26/24 1458   Weight: 107.5 kg (236 lb 15.9 oz)   Height: 5' 3" (1.6 m)       Review of Systems   Constitutional:  Negative for chills, diaphoresis, fatigue, fever and unexpected weight change.   HENT:  Negative for trouble swallowing.    Eyes:  Negative for visual disturbance.   Respiratory:  Negative for shortness of breath.    Cardiovascular:  Negative for chest pain.   Gastrointestinal:  Negative for abdominal pain, constipation, nausea and vomiting.   Genitourinary:  Negative for difficulty urinating.   Musculoskeletal:  Negative for arthralgias, back pain, gait problem, joint swelling, myalgias, neck pain and neck stiffness.   Neurological:  Negative for dizziness, speech difficulty, weakness, light-headedness, numbness and headaches.          Objective:      Physical Exam  Neurological:      Mental Status: She is alert and oriented to person, place, and time.      Comments: She is awake and in no acute distress  Mild tenderness to palpation posterior cervical paraspinous musculature with no external lesions or palpable masses noted  Cervical range of motion is within normal limits albeit with some discomfort at the endpoints of her range particularly in extension  Range of motion of the right shoulder is normal and painless  Reflexes- +1-+2 reflexes at the following:   C5-Biceps   C6-Brachioradialis   C7-Triceps   L3/4-Patellar   S1-Achilles   Colin sign is negative bilaterally  Strength testing- 5/5 strength in the following muscle groups:  C5-Elbow flexion  C6-Wrist extension  C7-Elbow extension  C8-Finger flexion  T1-Finger abduction  L2-Hip flexion  L3-Knee extension  L4-Ankle dorsiflexion  L5-Great toe extension  S1-Ankle plantar flexion              "       Assessment:       1. Cervicalgia    2. Cervical radiculitis           Plan:     She has a nonfocal neurological examination and no historical red flags.  She has symptoms of right cervical radiculitis C6/C7 with no evidence of nerve root dysfunction.  I think she can be treated conservatively.  We are going to give her a shot of Depo-Medrol and Toradol followed by a Medrol Dosepak.  Add gabapentin q.h.s..  Check on her by phone in 1 week to see how she is coming along.  Consider adding physical therapy.  Consider MRI if she fails to respond to the steroids.  X-ray C-spine      Cervicalgia    Cervical radiculitis

## 2024-08-27 ENCOUNTER — PATIENT MESSAGE (OUTPATIENT)
Dept: SPINE | Facility: CLINIC | Age: 64
End: 2024-08-27
Payer: MEDICARE

## 2024-08-28 ENCOUNTER — PATIENT MESSAGE (OUTPATIENT)
Dept: SPINE | Facility: CLINIC | Age: 64
End: 2024-08-28
Payer: MEDICARE

## 2024-09-03 DIAGNOSIS — M54.2 CERVICALGIA: Primary | ICD-10-CM

## 2024-09-03 DIAGNOSIS — M54.12 CERVICAL RADICULITIS: ICD-10-CM

## 2024-09-04 ENCOUNTER — HOSPITAL ENCOUNTER (OUTPATIENT)
Dept: RADIOLOGY | Facility: HOSPITAL | Age: 64
Discharge: HOME | End: 2024-09-04
Attending: FAMILY MEDICINE
Payer: MEDICARE

## 2024-09-04 ENCOUNTER — HOSPITAL ENCOUNTER (OUTPATIENT)
Dept: RADIOLOGY | Facility: HOSPITAL | Age: 64
Discharge: HOME | End: 2024-09-04
Attending: PHYSICAL MEDICINE & REHABILITATION
Payer: MEDICARE

## 2024-09-04 ENCOUNTER — CLINICAL SUPPORT (OUTPATIENT)
Dept: REHABILITATION | Facility: HOSPITAL | Age: 64
End: 2024-09-04
Payer: MEDICARE

## 2024-09-04 DIAGNOSIS — M54.12 CERVICAL RADICULITIS: ICD-10-CM

## 2024-09-04 DIAGNOSIS — M54.2 CERVICALGIA: ICD-10-CM

## 2024-09-04 DIAGNOSIS — R29.3 POSTURE ABNORMALITY: Primary | ICD-10-CM

## 2024-09-04 DIAGNOSIS — M25.60 RANGE OF MOTION DEFICIT: ICD-10-CM

## 2024-09-04 DIAGNOSIS — R92.8 ABNORMALITY OF RIGHT BREAST ON SCREENING MAMMOGRAM: ICD-10-CM

## 2024-09-04 PROCEDURE — 76642 ULTRASOUND BREAST LIMITED: CPT | Mod: TC,PO,RT

## 2024-09-04 PROCEDURE — 72141 MRI NECK SPINE W/O DYE: CPT | Mod: TC,PO

## 2024-09-04 PROCEDURE — 97112 NEUROMUSCULAR REEDUCATION: CPT | Mod: PN

## 2024-09-04 PROCEDURE — 97161 PT EVAL LOW COMPLEX 20 MIN: CPT | Mod: PN

## 2024-09-04 PROCEDURE — 76642 ULTRASOUND BREAST LIMITED: CPT | Mod: 26,RT,, | Performed by: RADIOLOGY

## 2024-09-04 PROCEDURE — 72141 MRI NECK SPINE W/O DYE: CPT | Mod: 26,,, | Performed by: RADIOLOGY

## 2024-09-09 ENCOUNTER — CLINICAL SUPPORT (OUTPATIENT)
Dept: REHABILITATION | Facility: HOSPITAL | Age: 64
End: 2024-09-09
Payer: MEDICARE

## 2024-09-09 DIAGNOSIS — R29.3 POSTURE ABNORMALITY: Primary | ICD-10-CM

## 2024-09-09 DIAGNOSIS — M25.60 RANGE OF MOTION DEFICIT: ICD-10-CM

## 2024-09-09 PROCEDURE — 97112 NEUROMUSCULAR REEDUCATION: CPT | Mod: PN

## 2024-09-09 PROCEDURE — 97530 THERAPEUTIC ACTIVITIES: CPT | Mod: PN

## 2024-09-09 PROCEDURE — 97110 THERAPEUTIC EXERCISES: CPT | Mod: PN

## 2024-09-09 PROCEDURE — 97140 MANUAL THERAPY 1/> REGIONS: CPT | Mod: PN

## 2024-09-09 NOTE — PROGRESS NOTES
[facility]   Physical Therapy Treatment Note     Name: Amanda Webb [facility] Number: [id number]    Therapy Diagnosis:   Encounter Diagnoses   Name Primary?    Posture abnormality Yes    Range of motion deficit      Physician: Adi Cevallos MD    Visit Date: 9/9/2024    Physician Orders: PT Eval and Treat  Medical Diagnosis from Referral:   Diagnosis   M54.2 (ICD-10-CM) - Cervicalgia   M54.12 (ICD-10-CM) - Cervical radiculitis      Evaluation Date: 9/4/2024  Authorization Period Expiration:     12/31/2024      Plan of Care Expiration: 10/30/2024  Progress Note Due: 10/4/2024  Visit # / Visits authorized: 1/ 20  FOTO: 1/ 3    FOTO 1st:   FOTO 3rd:  FOTO 10th:    Time in: 300pm  Time out: 345pm  Total Billable Time: 45 minutes    Precautions:  Standard       SUBJECTIVE     Pt reports: was feeling better until she fell asleep on that arm, increased pain from that.    She was compliant with home exercise program.  Response to previous treatment: ongoing  Functional change: ongoing    Pain: 4/10   Location: right Upper extremity      OBJECTIVE     Objective Measures updated at progress report unless specified.     Treatment       Amanda received the treatments listed below:      Manual therapy techniques: Joint mobilizations were applied to the: cervical and thoracic spine for 8 minutes, including:    Prone CTJ gapping grade III    Therapeutic exercises to develop strength, endurance, ROM, and flexibility for 8 minutes including:    Side Lying open book x 20 Bilateral   Seated thoracic extensions x 20    Neuromuscular re-education activities to improve: Balance, Coordination, Kinesthetic, Sense, and Proprioception for 21 minutes. The following activities were included:    Prone middle trap level 1 3 x 10  Wall slides red band x 30  Land mine press x 20 Bilateral      Therapeutic activities to improve functional performance for 8 minutes, including:    Upper Body Ergometer 4'/4' for  activity tolerance       Patient Education and Home Exercises     Home Exercises Provided and Patient Education Provided     Education provided:   - Home Exercise Program     Written Home Exercises Provided: Patient instructed to cont prior HEP. Exercises were reviewed and Amanda was able to demonstrate them prior to the end of the session.  Amanda demonstrated good  understanding of the education provided. See EMR under Patient Instructions for exercises provided during therapy sessions    ASSESSMENT     Amanda presented with increased symptoms from lying on her involved side. Within session she felt fine with no increase in pain. Interventions aimed at offloading nervous system. Will progress as tolerated.     Amanda is progressing well towards her goals.     Pt prognosis is Good.     Pt will continue to benefit from skilled outpatient physical therapy to address the deficits listed in the problem list box on initial evaluation, provide pt/family education and to maximize pt's level of independence in the home and community environment.     Pt's spiritual, cultural and educational needs considered and pt agreeable to plan of care and goals.     Anticipated barriers to physical therapy: age, BMI     Goals:   Short Term Goals (5 Weeks): -Progressing, not met   1. Patient will be independent with Home Exercise Program to supplement PT in improving pain free cervical mobility  2. Patient will improve cervical Active Range of Motion 10 deg in rotation to improve cervical mobility for driving  3. Patient will improve Upper extremity MMTs by 1/2 grade in all planes to improve strength for lifting and carrying tasks.  4. Patient will demonstrate improved sitting posture to decrease pain experienced in head and neck.     Long Term Goals (10 Weeks): -Progressing, not met   1. Patient will improve FOTO to predicted level to show true functional improvement.  2. Patient will improve cervical AROM to WNL in all planes to improve  cervical mobility for driving   3. Patient will improve UE MMTs 1 grade in all planes to improve strength for lifting and carrying tasks.  4. Patient will report no pain with lifting 30 lbs to promote physical activity.   5. Pt will report no pain with cervical AROM in all planes to promote QOL.  6. Patient goal:  to know what to be able to do to relieve the pain     PLAN   Plan of care Certification: 9/4/2024 to 10/30/2024.     Progress as tolerated with emphasis on offloading nervous system.     Sandhya Cooney, PT , DPT   Board Certified Clinical Specialist in Orthopedic Physical Therapy

## 2024-09-09 NOTE — PLAN OF CARE
[facility]   Physical Therapy Initial Evaluation      Name: Amanda Webb [facility] Number: [id number]    Therapy Diagnosis:   Encounter Diagnoses   Name Primary?    Cervicalgia     Cervical radiculitis     Posture abnormality Yes    Range of motion deficit         Physician: Adi Cevallos MD    Physician Orders: PT Eval and Treat  Medical Diagnosis from Referral:   Diagnosis   M54.2 (ICD-10-CM) - Cervicalgia   M54.12 (ICD-10-CM) - Cervical radiculitis     Evaluation Date: 9/4/2024  Authorization Period Expiration:     12/31/2024     Plan of Care Expiration: 10/30/2024  Progress Note Due: 10/4/2024  Visit # / Visits authorized: 1/ 1  FOTO: 1/ 3    Precautions: Standard     Time In: 100pm  Time Out: 155pm  Total Billable Time: 55 minutes    Subjective     Date of onset: week and a half    History of current condition - Amanda reports: insidious onset of right shoulder and elbow pain with right hand numbness of digits 1-3 and half of 4. Elbow hurts the most. Some pain between shoulder blades as well.   Hurts most of the day. Can't sleep on her side.   She keeps active with hobbies of yard work and refinishing furniture.   PMH: Bilateral TKAs and L4/5 partial laminectomy.     Falls: none    Imaging: see imaging section    Prior Therapy: for Bilateral knee replacements   Social History: Lives alone  Occupation: retired  Prior Level of Function: no pain or limitations with Activities of daily living   Current Level of Function: limited by pain in Activities of daily living     Pain:  Current 1/10, worst 8/10, best 1/10   Location: thoracic   Description: sharp, pulsating, numb  Aggravating Factors: positioning   Easing Factors: alieve     Patients goals: to know what to be able to do to relieve the pain      Medical History:   Past Medical History:   Diagnosis Date    Anxiety     Arthritis     Back pain     Cancer     precancerous skin lesion    Depression     Ganglion cyst of wrist,  left 2019    Dr Alves     Herniated disc     L-4     Hyperlipidemia     Knee pain     Prediabetes 2022    Psoriasis     Sleep apnea, unspecified 02/10/2020       Surgical History:   Amanda Clark  has a past surgical history that includes  section; Tonsillectomy; Adenoidectomy; arthroscopic left knee; vocal cord nodule removal; Colonoscopy (1/25/10); BACK injections; Steroid injection knee; Laminotomy (2015); Joint replacement (2016); Joint replacement (2015); Carpal tunnel release (Right, 2020); Eye surgery (Bilateral, 2022); and Colonoscopy (N/A, 2023).    Medications:   Amanda has a current medication list which includes the following prescription(s): cyclobenzaprine, difmetioxrime, fluconazole, gabapentin, methylprednisolone, sulindac, timolol maleate 0.5%, and wegovy.    Allergies:   Review of patient's allergies indicates:   Allergen Reactions    No known drug allergies         Objective      Observation:  Patient is a 64 year old female presenting alert and oriented.     Posture:  Bilateral DR scap worse on the right, depressed on the right     Cervical Range of Motion:    Active Range of Motion  Motion Range  Pain and Quality of Motion Norms   Flexion 55 Pain in shoulder blade 80-90 Degrees   Extension 45  70-80 Degrees   Right Side Bending 20 Very painful in shoulder blade 20-45 Degrees   Left Side Bending 40  20-45 Degrees   Right Rotation 45  70-90 Degrees   Left Rotation 45  70-90 Degrees   Full shoulder Active Range of Motion with no pain     Passive Range of Motion:  Motion Range Pain and mobility Norms   C0/1 Flexion/Extension 5/5  5/10 Degrees   C0/1 Side bend right/left 5/5  5/5 Degrees   C1/2 Rotation right/left 35/35  45/45 Degrees   C0-3 Rotation right/left 50/50  60/60 Degrees     Joint Mobility:  C2-7 side glide assessment Hypomobile lower cervical spine   Thoracic spine  Hypomobile       Cervical Special Tests:    Cervical Cluster:   Left  Right   Spurling's Negative  Positive    Distraction Negative  Negative    Median Nerve Tension Test Negative  Positive    Cervical Rotation <60  Positive  Positive         Upper Extremity Neuro Screen:  Dermatomes:  Level Left RIght   C1 (Vertex of Head) Within Normal Limits Within Normal Limits   C2 (Posterior to ear) Within Normal Limits Within Normal Limits   C3 (Lateral neck) Within Normal Limits Within Normal Limits   C4 (Lateral shoulder area) Within Normal Limits Within Normal Limits   C5 (Lateral arm) Within Normal Limits Impaired    C6 (Posterior thumb) Within Normal Limits Within Normal Limits   C7 (Posterior middle finger) Within Normal Limits Within Normal Limits   C8 (Posterior little finger) Within Normal Limits Within Normal Limits   T1 (Medial Forearm) Within Normal Limits Within Normal Limits     Myotomes:  Level Left RIght   C1 (Cervical flexion) 4/5    C2 (Cervical Extension) 4/5    C3 (Cervical Side Bend) 4/5 4/5   C4 (Shoulder Elevation) 4/5 4/5   C5 (Shoulder abduction) 4/5 4/5   C6 (Elbow Flexion/ Wrist extension) 4/5 4-/5   C7 (Elbow Extension/ Wrist Flexion) 4/5 4-/5   C8 (Thumb Extension) 4/5 4/5   T1 (Finger abduction/ adduction) 4/5 4/5     Reflexes:  Level Left Right   C5 (Biceps) 1+ 1+   C6 (Brachioradialis) 1+ 1+   C7 (Triceps) 1+ 1+        Intake Outcome Measure for FOTO Neck Survey    Therapist reviewed FOTO scores for Amanda Clark on 9/4/2024.   FOTO report - see Media section or FOTO account episode details.    Intake Score: see media section          Treatment     Total Treatment time (time-based codes) separate from Evaluation: 10 minutes     Amanda received the treatments listed below:      neuromuscular re-education activities to improve: Coordination, Kinesthetic, Proprioception, and Posture for 10 minutes. The following activities were included:    Home Exercise Program review and education       Patient Education and Home Exercises     Education provided:   - Home  Exercise Program, diagnosis, prognosis, Plan of care     Written Home Exercises Provided: Yes. Exercises were reviewed and Amanda was able to demonstrate them prior to the end of the session.  Amanda demonstrated good  understanding of the education provided. See EMR under Patient Instructions for exercises provided during therapy sessions.    Assessment     Amanda is a 64 y.o. female referred to outpatient Physical Therapy with a medical diagnosis of cervicalgia. Patient presents with Positive cervical radiculopathy cluster, postural impairments, weakness, and functional limitations of pain with Activities of daily living and hobbies.     Patient prognosis is Good.   Patient will benefit from skilled outpatient Physical Therapy to address the deficits stated above and in the chart below, provide patient /family education, and to maximize patientt's level of independence.     Plan of care discussed with patient: Yes  Patient's spiritual, cultural and educational needs considered and patient is agreeable to the plan of care and goals as stated below:     Anticipated Barriers for therapy: age, BMI    Medical Necessity is demonstrated by the following  History  Co-morbidities and personal factors that may impact the plan of care [] LOW: no personal factors / co-morbidities  [] MODERATE: 1-2 personal factors / co-morbidities  [x] HIGH: 3+ personal factors / co-morbidities    Moderate / High Support Documentation:   Co-morbidities affecting plan of care:   Anxiety    Arthritis    Back pain    Cancer    precancerous skin lesion   Depression    Ganglion cyst of wrist, left    Dr Alves    Herniated disc    L-4    Hyperlipidemia    Knee pain    Prediabetes    Psoriasis    Sleep apnea, unspecified    Age  BMI    Personal Factors:   no deficits     Examination  Body Structures and Functions, activity limitations and participation restrictions that may impact the plan of care [] LOW: addressing 1-2 elements  [] MODERATE: 3+  elements  [x] HIGH: 4+ elements (please support below)    Moderate / High Support Documentation: posture, strength, range of motion, joint mobility      Clinical Presentation [x] LOW: stable  [] MODERATE: Evolving  [] HIGH: Unstable     Decision Making/ Complexity Score: low       Goals:  Short Term Goals (5 Weeks):   1. Patient will be independent with Home Exercise Program to supplement PT in improving pain free cervical mobility  2. Patient will improve cervical Active Range of Motion 10 deg in rotation to improve cervical mobility for driving  3. Patient will improve Upper extremity MMTs by 1/2 grade in all planes to improve strength for lifting and carrying tasks.  4. Patient will demonstrate improved sitting posture to decrease pain experienced in head and neck.    Long Term Goals (10 Weeks):   1. Patient will improve FOTO to predicted level to show true functional improvement.  2. Patient will improve cervical AROM to WNL in all planes to improve cervical mobility for driving   3. Patient will improve UE MMTs 1 grade in all planes to improve strength for lifting and carrying tasks.  4. Patient will report no pain with lifting 30 lbs to promote physical activity.   5. Pt will report no pain with cervical AROM in all planes to promote QOL.  6. Patient goal:  to know what to be able to do to relieve the pain     Plan     Plan of care Certification: 9/4/2024 to 10/30/2024.    Outpatient Physical Therapy 1-2 times weekly for 10 weeks to include the following interventions: Manual Therapy, Moist Heat/ Ice, Neuromuscular Re-ed, Patient Education, Therapeutic Activities, and Therapeutic Exercise.     Sandhya Cooney, PT ,DPT   Board Certified Clinical Specialist in Orthopedic Physical Therapy         Physician's Signature: _________________________________________ Date: ________________

## 2024-09-10 ENCOUNTER — PATIENT MESSAGE (OUTPATIENT)
Dept: SPINE | Facility: CLINIC | Age: 64
End: 2024-09-10
Payer: MEDICARE

## 2024-09-16 ENCOUNTER — CLINICAL SUPPORT (OUTPATIENT)
Dept: REHABILITATION | Facility: HOSPITAL | Age: 64
End: 2024-09-16
Payer: MEDICARE

## 2024-09-16 DIAGNOSIS — R29.3 POSTURE ABNORMALITY: Primary | ICD-10-CM

## 2024-09-16 DIAGNOSIS — M25.60 RANGE OF MOTION DEFICIT: ICD-10-CM

## 2024-09-16 PROCEDURE — 97112 NEUROMUSCULAR REEDUCATION: CPT | Mod: PN

## 2024-09-16 PROCEDURE — 97530 THERAPEUTIC ACTIVITIES: CPT | Mod: PN

## 2024-09-16 NOTE — PROGRESS NOTES
[facility]   Physical Therapy Treatment Note     Name: Amanda Webb [facility] Number: [id number]    Therapy Diagnosis:   Encounter Diagnoses   Name Primary?    Posture abnormality Yes    Range of motion deficit        Physician: Adi Cevallos MD    Visit Date: 9/16/2024    Physician Orders: PT Eval and Treat  Medical Diagnosis from Referral:   Diagnosis   M54.2 (ICD-10-CM) - Cervicalgia   M54.12 (ICD-10-CM) - Cervical radiculitis      Evaluation Date: 9/4/2024  Authorization Period Expiration:     12/31/2024      Plan of Care Expiration: 10/30/2024  Progress Note Due: 10/4/2024  Visit # / Visits authorized: 2/ 20  FOTO: 1/ 3    FOTO 1st:   FOTO 3rd:  FOTO 10th:    Time in: 300pm  Time out: 345pm  Total Billable Time: 30 minutes    Precautions:  Standard       SUBJECTIVE     Pt reports: arm has been doing good, still having the numbness but no pain.     She was compliant with home exercise program.  Response to previous treatment: ongoing  Functional change: ongoing    Pain: 4/10   Location: right Upper extremity      OBJECTIVE     Objective Measures updated at progress report unless specified.     Treatment       Amanda received the treatments listed below:      Manual therapy techniques: Joint mobilizations were applied to the: cervical and thoracic spine for 8 minutes, including:    Prone CTJ gapping grade III    Therapeutic exercises to develop strength, endurance, ROM, and flexibility for 8 minutes including:    Side Lying open book x 20 Bilateral   Seated thoracic extensions x 20    Neuromuscular re-education activities to improve: Balance, Coordination, Kinesthetic, Sense, and Proprioception for 21 minutes. The following activities were included:    Prone middle trap level 1, 3 x 10  Wall slides red band x 30  Land mine press x 20 Bilateral    Red band Internal Rotation/External Rotation x 30 each     Therapeutic activities to improve functional performance for 8 minutes,  including:    Upper Body Ergometer 4'/4' for activity tolerance       Patient Education and Home Exercises     Home Exercises Provided and Patient Education Provided     Education provided:   - Home Exercise Program     Written Home Exercises Provided: Patient instructed to cont prior HEP. Exercises were reviewed and Amanda was able to demonstrate them prior to the end of the session.  Amanda demonstrated good  understanding of the education provided. See EMR under Patient Instructions for exercises provided during therapy sessions    ASSESSMENT     Amanda presented with improved symptoms. Continued numbness in hand but no pain. Will continue to progress as tolerated.      Amanda is progressing well towards her goals.     Pt prognosis is Good.     Pt will continue to benefit from skilled outpatient physical therapy to address the deficits listed in the problem list box on initial evaluation, provide pt/family education and to maximize pt's level of independence in the home and community environment.     Pt's spiritual, cultural and educational needs considered and pt agreeable to plan of care and goals.     Anticipated barriers to physical therapy: age, BMI     Goals:   Short Term Goals (5 Weeks): -Progressing, not met   1. Patient will be independent with Home Exercise Program to supplement PT in improving pain free cervical mobility  2. Patient will improve cervical Active Range of Motion 10 deg in rotation to improve cervical mobility for driving  3. Patient will improve Upper extremity MMTs by 1/2 grade in all planes to improve strength for lifting and carrying tasks.  4. Patient will demonstrate improved sitting posture to decrease pain experienced in head and neck.     Long Term Goals (10 Weeks): -Progressing, not met   1. Patient will improve FOTO to predicted level to show true functional improvement.  2. Patient will improve cervical AROM to WNL in all planes to improve cervical mobility for driving   3.  Patient will improve UE MMTs 1 grade in all planes to improve strength for lifting and carrying tasks.  4. Patient will report no pain with lifting 30 lbs to promote physical activity.   5. Pt will report no pain with cervical AROM in all planes to promote QOL.  6. Patient goal:  to know what to be able to do to relieve the pain     PLAN   Plan of care Certification: 9/4/2024 to 10/30/2024.     Progress as tolerated with emphasis on offloading nervous system.     Sandhya Cooney, PT , DPT   Board Certified Clinical Specialist in Orthopedic Physical Therapy

## 2024-09-18 ENCOUNTER — CLINICAL SUPPORT (OUTPATIENT)
Dept: REHABILITATION | Facility: HOSPITAL | Age: 64
End: 2024-09-18
Payer: MEDICARE

## 2024-09-18 DIAGNOSIS — B35.1 ONYCHOMYCOSIS: ICD-10-CM

## 2024-09-18 DIAGNOSIS — M25.60 RANGE OF MOTION DEFICIT: ICD-10-CM

## 2024-09-18 DIAGNOSIS — R29.3 POSTURE ABNORMALITY: Primary | ICD-10-CM

## 2024-09-18 PROCEDURE — 97112 NEUROMUSCULAR REEDUCATION: CPT | Mod: PN

## 2024-09-18 PROCEDURE — 97530 THERAPEUTIC ACTIVITIES: CPT | Mod: PN

## 2024-09-18 NOTE — PROGRESS NOTES
[facility]   Physical Therapy Treatment Note     Name: Amanda Webb [facility] Number: [id number]    Therapy Diagnosis:   Encounter Diagnoses   Name Primary?    Posture abnormality Yes    Range of motion deficit        Physician: Adi Cevallos MD    Visit Date: 9/18/2024    Physician Orders: PT Eval and Treat  Medical Diagnosis from Referral:   Diagnosis   M54.2 (ICD-10-CM) - Cervicalgia   M54.12 (ICD-10-CM) - Cervical radiculitis      Evaluation Date: 9/4/2024  Authorization Period Expiration:     12/31/2024      Plan of Care Expiration: 10/30/2024  Progress Note Due: 10/4/2024  Visit # / Visits authorized: 3/ 20  FOTO: 1/ 3    FOTO 1st:   FOTO 3rd:  FOTO 10th:    Time in: 1000am  Time out: 1035am  Total Billable Time: 30 minutes    Precautions:  Standard       SUBJECTIVE     Pt reports: feeling about the same, still has numbness and pain but mainly in the wrist now.     She was compliant with home exercise program.  Response to previous treatment: ongoing  Functional change: ongoing    Pain: 4/10   Location: right Upper extremity      OBJECTIVE     Objective Measures updated at progress report unless specified.     Treatment       Amanda received the treatments listed below:      Manual therapy techniques: Joint mobilizations were applied to the: cervical and thoracic spine for 8 minutes, including:    Prone CTJ gapping grade III    Therapeutic exercises to develop strength, endurance, ROM, and flexibility for 8 minutes including:    Side Lying open book x 20 Bilateral   Seated thoracic extensions x 20    Neuromuscular re-education activities to improve: Balance, Coordination, Kinesthetic, Sense, and Proprioception for 21 minutes. The following activities were included:    Prone middle trap level 1, 3 x 10  Wall slides red band x 30  Land mine press x 20 Bilateral    Red band Internal Rotation/External Rotation x 30 each   Yellow band horizontal abduction 3 x 10  Yellow band lat  pull down 3 x 10    Therapeutic activities to improve functional performance for 8 minutes, including:    Upper Body Ergometer 4'/4' for activity tolerance level 1      Patient Education and Home Exercises     Home Exercises Provided and Patient Education Provided     Education provided:   - Home Exercise Program     Written Home Exercises Provided: Patient instructed to cont prior HEP. Exercises were reviewed and Amanda was able to demonstrate them prior to the end of the session.  Amanda demonstrated good  understanding of the education provided. See EMR under Patient Instructions for exercises provided during therapy sessions    ASSESSMENT     Amanda presented with improved symptoms. Continued numbness in hand but no pain. Will continue to progress as tolerated.      Amanda is progressing well towards her goals.     Pt prognosis is Good.     Pt will continue to benefit from skilled outpatient physical therapy to address the deficits listed in the problem list box on initial evaluation, provide pt/family education and to maximize pt's level of independence in the home and community environment.     Pt's spiritual, cultural and educational needs considered and pt agreeable to plan of care and goals.     Anticipated barriers to physical therapy: age, BMI     Goals:   Short Term Goals (5 Weeks): -Progressing, not met   1. Patient will be independent with Home Exercise Program to supplement PT in improving pain free cervical mobility  2. Patient will improve cervical Active Range of Motion 10 deg in rotation to improve cervical mobility for driving  3. Patient will improve Upper extremity MMTs by 1/2 grade in all planes to improve strength for lifting and carrying tasks.  4. Patient will demonstrate improved sitting posture to decrease pain experienced in head and neck.     Long Term Goals (10 Weeks): -Progressing, not met   1. Patient will improve FOTO to predicted level to show true functional improvement.  2. Patient  will improve cervical AROM to WNL in all planes to improve cervical mobility for driving   3. Patient will improve UE MMTs 1 grade in all planes to improve strength for lifting and carrying tasks.  4. Patient will report no pain with lifting 30 lbs to promote physical activity.   5. Pt will report no pain with cervical AROM in all planes to promote QOL.  6. Patient goal:  to know what to be able to do to relieve the pain     PLAN   Plan of care Certification: 9/4/2024 to 10/30/2024.     Progress as tolerated with emphasis on offloading nervous system.     Sandhya Cooney, PT , DPT   Board Certified Clinical Specialist in Orthopedic Physical Therapy

## 2024-09-19 ENCOUNTER — OFFICE VISIT (OUTPATIENT)
Dept: SPINE | Facility: CLINIC | Age: 64
End: 2024-09-19
Payer: MEDICARE

## 2024-09-19 VITALS — BODY MASS INDEX: 41.99 KG/M2 | HEIGHT: 63 IN | WEIGHT: 237 LBS

## 2024-09-19 DIAGNOSIS — M54.12 CERVICAL RADICULITIS: Primary | ICD-10-CM

## 2024-09-19 DIAGNOSIS — M54.2 CERVICALGIA: ICD-10-CM

## 2024-09-19 RX ORDER — FLUCONAZOLE 200 MG/1
TABLET ORAL
Qty: 12 TABLET | Refills: 0 | Status: SHIPPED | OUTPATIENT
Start: 2024-09-19

## 2024-09-19 NOTE — PROGRESS NOTES
SUBJECTIVE:    Patient ID: Amanda Clark is a 64 y.o. female.    Chief Complaint: Follow-up    She is here to review her cervical MRI done 09/04/2024 to evaluate her complaint of posterior neck pain with radiating discomfort into digits 1 through 3 of the right hand.      The MRI is summarized below:      FINDINGS:  At C2-C3, mild right facet joint hypertrophic change and ankylosis partially visualized.  No central canal or neural foramen narrowing.     At C3-C4, mild bilateral facet joint osteoarthrosis without neural foramina or central canal narrowing.     At C4-C5, disc osteophyte complex results in mild central canal narrowing with contact and deformity of midline cervical cord.  Moderate left facet joint osteoarthrosis is present resulting in moderate left neural foramen narrowing.  1-2 mm anterolisthesis of C4 on C5 are present.     At C5-C6, disc osteophyte complex results in mild central canal narrowing.  Mild right facet joint osteoarthrosis is present contributing to mild right neural foramen narrowing.     At C6-C7, prominent right midline and subarticular disc protrusion and disc osteophyte complex causes severe narrowing of medial right neural foramen with compression of right C7 nerve roots.  This is superimposed on disc osteophyte complex resulting in mild central canal narrowing with contact and deformity of the anterior cord.  Mild left facet joint osteoarthrosis is present.     At C7-T1, normal.     Slight reversal cervical lordosis inner centered at C5-C6.  Vertebral bodies maintain normal bone marrow signal.     Cervical cord is of normal caliber and contains normal signal.  Visualized paraspinal soft tissues are unremarkable.     Impression:     1. C4-C5 disc degeneration and moderate left facet joint osteoarthrosis resulting in mild central canal narrowing moderate left neural foramen narrowing.  Correlation for left C5 radiculopathy is requested.  2. Right midline in subarticular  C6-C7 disc protrusion and disc osteophyte complex causing severe narrowing of the medial right neural foramen with compression of right C7 nerve root.  Correlation for right C7 radiculopathy is requested.  3. Additional multilevel cervical spine degenerative changes as described.      Clinically she is improving.  She is in physical therapy but she is not sure whether it is helping or she is just getting better naturally regardless she has improved.  Current pain level is 2/10 which is tolerable.  Note she has no symptoms referable to the left C5 nerve root.  She has ongoing but improved numbness and tingling primarily into digits 2 and 3 of the right hand but somewhat into the right thumb and somewhat into the right 4th finger as well.            Past Medical History:   Diagnosis Date    Anxiety     Arthritis     Back pain     Cancer     precancerous skin lesion    Depression     Ganglion cyst of wrist, left 2019    Dr Alves     Herniated disc     L-4     Hyperlipidemia     Knee pain     Prediabetes 2022    Psoriasis     Sleep apnea, unspecified 02/10/2020     Social History     Socioeconomic History    Marital status:    Occupational History     Employer: [employer]   Tobacco Use    Smoking status: Former     Current packs/day: 0.00     Types: Cigarettes     Quit date: 1988     Years since quittin.5    Smokeless tobacco: Never   Substance and Sexual Activity    Alcohol use: Yes     Comment: sometimes    Drug use: No    Sexual activity: Not Currently     Birth control/protection: None     Social Determinants of Health     Financial Resource Strain: Low Risk  (2024)    Overall Financial Resource Strain (CARDIA)     Difficulty of Paying Living Expenses: Not hard at all   Food Insecurity: No Food Insecurity (2024)    Hunger Vital Sign     Worried About Running Out of Food in the Last Year: Never true     Ran Out of Food in the Last Year: Never true    Transportation Needs: No Transportation Needs (3/23/2024)    PRAPARE - Transportation     Lack of Transportation (Medical): No     Lack of Transportation (Non-Medical): No   Physical Activity: Inactive (2024)    Exercise Vital Sign     Days of Exercise per Week: 0 days     Minutes of Exercise per Session: 30 min   Stress: No Stress Concern Present (2024)    [employer] - Occupational Stress Questionnaire     Feeling of Stress : Not at all   Housing Stability: Low Risk  (3/23/2024)    Housing Stability Vital Sign     Unable to Pay for Housing in the Last Year: No     Number of Places Lived in the Last Year: 1     Unstable Housing in the Last Year: No     Past Surgical History:   Procedure Laterality Date    ADENOIDECTOMY      arthroscopic left knee      BACK injections       & 2013    CARPAL TUNNEL RELEASE Right 2020     SECTION      COLONOSCOPY  1/25/10    Dr. Coronado, 10 year recheck    COLONOSCOPY N/A 2023    Procedure: COLONOSCOPY;  Surgeon: Obdulia Valentine MD;  Location: [facility];  Service: Endoscopy;  Laterality: N/A;  ppm sent    EYE SURGERY Bilateral 2022    bilateral cataract surgery     JOINT REPLACEMENT  2016    right knee     JOINT REPLACEMENT  2015    left knee     LAMINOTOMY  2015    Dr Lazo    STEROID INJECTION KNEE      TONSILLECTOMY      vocal cord nodule removal       Family History   Problem Relation Name Age of Onset    Heart disease Mother      Diabetes Mother      Psoriasis Mother      Macular degeneration Mother      Stroke Mother      Hearing loss Mother      Arthritis Mother      Cancer Father          bone, prostate    Heart disease Sister      Diabetes Sister      Hypertension Sister      Depression Sister      Macular degeneration Sister      No Known Problems Daughter      Heart disease Maternal Aunt      Dementia Maternal Aunt      Cancer Paternal Uncle          tumor abdominal    Heart  "disease Paternal Uncle      Dementia Paternal Uncle      Melanoma Neg Hx      Lupus Neg Hx      Eczema Neg Hx       Vitals:    09/19/24 1534   Weight: 107.5 kg (236 lb 15.9 oz)   Height: 5' 3" (1.6 m)       Review of Systems   Constitutional:  Negative for chills, diaphoresis, fatigue, fever and unexpected weight change.   HENT:  Negative for trouble swallowing.    Eyes:  Negative for visual disturbance.   Respiratory:  Negative for shortness of breath.    Cardiovascular:  Negative for chest pain.   Gastrointestinal:  Negative for abdominal pain, constipation, nausea and vomiting.   Genitourinary:  Negative for difficulty urinating.   Musculoskeletal:  Negative for arthralgias, back pain, gait problem, joint swelling, myalgias, neck pain and neck stiffness.   Neurological:  Negative for dizziness, speech difficulty, weakness, light-headedness, numbness and headaches.          Objective:      Physical Exam  Neurological:      Mental Status: She is alert and oriented to person, place, and time.             Assessment:       1. Cervical radiculitis    2. Cervicalgia           Plan:     I reassured her there are no worrisome findings on her MRI.  I think she is most symptomatic from the disc herniation on the right at C6-7.  We talked about treatment options which include living with the ongoing discomfort versus epidural steroid injection versus surgical intervention.  She is going to continue as is.  At her discretion she could consider interlaminar injections at C6-7.  She also is not sure whether gabapentin is helping.  She can discontinue that at her discretion.  Follow up here as needed      Cervical radiculitis    Cervicalgia          "

## 2024-09-23 ENCOUNTER — CLINICAL SUPPORT (OUTPATIENT)
Dept: REHABILITATION | Facility: HOSPITAL | Age: 64
End: 2024-09-23
Payer: MEDICARE

## 2024-09-23 DIAGNOSIS — M25.60 RANGE OF MOTION DEFICIT: ICD-10-CM

## 2024-09-23 DIAGNOSIS — R29.3 POSTURE ABNORMALITY: Primary | ICD-10-CM

## 2024-09-23 PROCEDURE — 97530 THERAPEUTIC ACTIVITIES: CPT | Mod: PN

## 2024-09-23 PROCEDURE — 97112 NEUROMUSCULAR REEDUCATION: CPT | Mod: PN

## 2024-09-23 NOTE — PROGRESS NOTES
[facility]   Physical Therapy Treatment Note     Name: Amanda Webb [facility] Number: [id number]    Therapy Diagnosis:   Encounter Diagnoses   Name Primary?    Posture abnormality Yes    Range of motion deficit          Physician: Adi Cevallos MD    Visit Date: 9/23/2024    Physician Orders: PT Eval and Treat  Medical Diagnosis from Referral:   Diagnosis   M54.2 (ICD-10-CM) - Cervicalgia   M54.12 (ICD-10-CM) - Cervical radiculitis      Evaluation Date: 9/4/2024  Authorization Period Expiration:     12/31/2024      Plan of Care Expiration: 10/30/2024  Progress Note Due: 10/4/2024  Visit # / Visits authorized: 4/ 20  FOTO: 1/ 3    FOTO 1st:   FOTO 3rd:  FOTO 10th:    Time in: 1000am  Time out: 1045am  Total Billable Time: 45 minutes    Precautions:  Standard       SUBJECTIVE     Pt reports: still has numbness in the hand, wrist hurts her sometimes. Otherwise doing better.      She was compliant with home exercise program.  Response to previous treatment: ongoing  Functional change: ongoing    Pain: 4/10   Location: right Upper extremity      OBJECTIVE     Objective Measures updated at progress report unless specified.     Treatment       Amanda received the treatments listed below:      Manual therapy techniques: Joint mobilizations were applied to the: cervical and thoracic spine for 8 minutes, including:    Prone CTJ gapping grade III    Therapeutic exercises to develop strength, endurance, ROM, and flexibility for 8 minutes including:    Side Lying open book x 20 Bilateral   Seated thoracic extensions x 20    Neuromuscular re-education activities to improve: Balance, Coordination, Kinesthetic, Sense, and Proprioception for 21 minutes. The following activities were included:    Prone middle trap level 2, 3 x 10  Wall slides red band x 30  Land mine press x 20 Bilateral    Red band Internal Rotation/External Rotation x 30 each   Yellow band horizontal abduction 3 x 10  Yellow band  lat pull down 3 x 10  Y lift off red band x 20     Therapeutic activities to improve functional performance for 8 minutes, including:    Upper Body Ergometer 4'/4' for activity tolerance level 2      Patient Education and Home Exercises     Home Exercises Provided and Patient Education Provided     Education provided:   - Home Exercise Program     Written Home Exercises Provided: Patient instructed to cont prior HEP. Exercises were reviewed and Amanda was able to demonstrate them prior to the end of the session.  Amanda demonstrated good  understanding of the education provided. See EMR under Patient Instructions for exercises provided during therapy sessions    ASSESSMENT     Amanda presented with minimal pain. She tolerated progressions well with good muscle burn and no increase in pain. Will continue to progress as able.     Amanda is progressing well towards her goals.     Pt prognosis is Good.     Pt will continue to benefit from skilled outpatient physical therapy to address the deficits listed in the problem list box on initial evaluation, provide pt/family education and to maximize pt's level of independence in the home and community environment.     Pt's spiritual, cultural and educational needs considered and pt agreeable to plan of care and goals.     Anticipated barriers to physical therapy: age, BMI     Goals:   Short Term Goals (5 Weeks): -Progressing, not met   1. Patient will be independent with Home Exercise Program to supplement PT in improving pain free cervical mobility  2. Patient will improve cervical Active Range of Motion 10 deg in rotation to improve cervical mobility for driving  3. Patient will improve Upper extremity MMTs by 1/2 grade in all planes to improve strength for lifting and carrying tasks.  4. Patient will demonstrate improved sitting posture to decrease pain experienced in head and neck.     Long Term Goals (10 Weeks): -Progressing, not met   1. Patient will improve FOTO to  predicted level to show true functional improvement.  2. Patient will improve cervical AROM to WNL in all planes to improve cervical mobility for driving   3. Patient will improve UE MMTs 1 grade in all planes to improve strength for lifting and carrying tasks.  4. Patient will report no pain with lifting 30 lbs to promote physical activity.   5. Pt will report no pain with cervical AROM in all planes to promote QOL.  6. Patient goal:  to know what to be able to do to relieve the pain     PLAN   Plan of care Certification: 9/4/2024 to 10/30/2024.     Progress as tolerated with emphasis on offloading nervous system.     Sandhya Cooney, PT , DPT   Board Certified Clinical Specialist in Orthopedic Physical Therapy

## 2024-09-25 ENCOUNTER — CLINICAL SUPPORT (OUTPATIENT)
Dept: REHABILITATION | Facility: HOSPITAL | Age: 64
End: 2024-09-25
Payer: MEDICARE

## 2024-09-25 DIAGNOSIS — M25.60 RANGE OF MOTION DEFICIT: ICD-10-CM

## 2024-09-25 DIAGNOSIS — R29.3 POSTURE ABNORMALITY: Primary | ICD-10-CM

## 2024-09-25 PROCEDURE — 97112 NEUROMUSCULAR REEDUCATION: CPT | Mod: KX,PN,CQ

## 2024-09-25 PROCEDURE — 97110 THERAPEUTIC EXERCISES: CPT | Mod: KX,PN,CQ

## 2024-09-25 PROCEDURE — 97530 THERAPEUTIC ACTIVITIES: CPT | Mod: KX,PN,CQ

## 2024-09-25 NOTE — PROGRESS NOTES
[facility]   Physical Therapy Treatment Note     Name: Amanda Webb [facility] Number: [id number]    Therapy Diagnosis:   Encounter Diagnoses   Name Primary?    Posture abnormality Yes    Range of motion deficit          Physician: Adi Cevallos MD    Visit Date: 9/25/2024    Physician Orders: PT Eval and Treat  Medical Diagnosis from Referral:   Diagnosis   M54.2 (ICD-10-CM) - Cervicalgia   M54.12 (ICD-10-CM) - Cervical radiculitis      Evaluation Date: 9/4/2024  Authorization Period Expiration:     12/31/2024      Plan of Care Expiration: 10/30/2024  Progress Note Due: 10/4/2024  Visit # / Visits authorized: 5/ 20  FOTO: 1/ 3    FOTO 1st:   FOTO 3rd:  FOTO 10th:    Time in: 1000am  Time out: 1054 am  Total Billable Time: 54 minutes    Precautions:  Standard       SUBJECTIVE     Pt reports: Pain is much better. Continues with numbness in her hand.     She was compliant with home exercise program.  Response to previous treatment: ongoing  Functional change: ongoing    Pain: 4/10   Location: right Upper extremity      OBJECTIVE     Objective Measures updated at progress report unless specified.     Treatment       Amanda received the treatments listed below:      Manual therapy techniques: Joint mobilizations were applied to the: cervical and thoracic spine for 8 minutes, including:    Prone CTJ gapping grade III  Manual nerve glides median    Therapeutic exercises to develop strength, endurance, ROM, and flexibility for 8 minutes including:    Side Lying open book x 20 Bilateral   Seated thoracic extensions x 20    Neuromuscular re-education activities to improve: Balance, Coordination, Kinesthetic, Sense, and Proprioception for 15 minutes. The following activities were included:    Prone middle trap level 2, 3 x 10  Red band Internal Rotation/External Rotation x 30 each   Yellow band horizontal abduction 3 x 10    Therapeutic activities to improve functional performance for 23  minutes, including:    Upper Body Ergometer 4'/4' for activity tolerance level 2  Wall slides red band x 30  Land mine press x 30 Bilateral   Yellow band lat pull down 3 x 10  Y lift off red band x 20     Patient Education and Home Exercises     Home Exercises Provided and Patient Education Provided     Education provided:   - Home Exercise Program     Written Home Exercises Provided: Patient instructed to cont prior HEP. Exercises were reviewed and Amanda was able to demonstrate them prior to the end of the session.  Amanda demonstrated good  understanding of the education provided. See EMR under Patient Instructions for exercises provided during therapy sessions    ASSESSMENT     Amanda is making great progress evident by reduction in pain. Attempted to improve numbness in hand with nerve glides and was able to make mild improvement. Updated Home Exercise Program to include nerve glides. Will progress as able.      Amanda is progressing well towards her goals.     Pt prognosis is Good.     Pt will continue to benefit from skilled outpatient physical therapy to address the deficits listed in the problem list box on initial evaluation, provide pt/family education and to maximize pt's level of independence in the home and community environment.     Pt's spiritual, cultural and educational needs considered and pt agreeable to plan of care and goals.     Anticipated barriers to physical therapy: age, BMI     Goals:   Short Term Goals (5 Weeks): -Progressing, not met   1. Patient will be independent with Home Exercise Program to supplement PT in improving pain free cervical mobility  2. Patient will improve cervical Active Range of Motion 10 deg in rotation to improve cervical mobility for driving  3. Patient will improve Upper extremity MMTs by 1/2 grade in all planes to improve strength for lifting and carrying tasks.  4. Patient will demonstrate improved sitting posture to decrease pain experienced in head and neck.      Long Term Goals (10 Weeks): -Progressing, not met   1. Patient will improve FOTO to predicted level to show true functional improvement.  2. Patient will improve cervical AROM to WNL in all planes to improve cervical mobility for driving   3. Patient will improve UE MMTs 1 grade in all planes to improve strength for lifting and carrying tasks.  4. Patient will report no pain with lifting 30 lbs to promote physical activity.   5. Pt will report no pain with cervical AROM in all planes to promote QOL.  6. Patient goal:  to know what to be able to do to relieve the pain     PLAN   Plan of care Certification: 9/4/2024 to 10/30/2024.     Progress as tolerated with emphasis on offloading nervous system.     Frida Valerio, PTA

## 2024-10-01 ENCOUNTER — CLINICAL SUPPORT (OUTPATIENT)
Dept: REHABILITATION | Facility: HOSPITAL | Age: 64
End: 2024-10-01
Payer: MEDICARE

## 2024-10-01 DIAGNOSIS — R29.3 POSTURE ABNORMALITY: Primary | ICD-10-CM

## 2024-10-01 DIAGNOSIS — M25.60 RANGE OF MOTION DEFICIT: ICD-10-CM

## 2024-10-01 PROCEDURE — 97110 THERAPEUTIC EXERCISES: CPT | Mod: KX,PN,CQ

## 2024-10-01 PROCEDURE — 97530 THERAPEUTIC ACTIVITIES: CPT | Mod: KX,PN,CQ

## 2024-10-01 PROCEDURE — 97112 NEUROMUSCULAR REEDUCATION: CPT | Mod: KX,PN,CQ

## 2024-10-01 NOTE — PROGRESS NOTES
[facility]   Physical Therapy Treatment Note     Name: Amanda Webb [facility] Number: [id number]    Therapy Diagnosis:   Encounter Diagnoses   Name Primary?    Posture abnormality Yes    Range of motion deficit            Physician: Adi Cevallos MD    Visit Date: 10/1/2024    Physician Orders: PT Eval and Treat  Medical Diagnosis from Referral:   Diagnosis   M54.2 (ICD-10-CM) - Cervicalgia   M54.12 (ICD-10-CM) - Cervical radiculitis      Evaluation Date: 9/4/2024  Authorization Period Expiration:     12/31/2024      Plan of Care Expiration: 10/30/2024  Progress Note Due: 10/4/2024  Visit # / Visits authorized: 6/ 20  FOTO: 1/ 3    FOTO 1st:   FOTO 3rd:  FOTO 10th:    Time in: 1000am  Time out: 1054 am  Total Billable Time: 54 minutes  Supervised by a licensed physical therapist, who served as extender     Precautions:  Standard       SUBJECTIVE     Pt reports: Has been consistent with performing nerve glides at home. Reports her finger is no longer a constant state of numb it now tingles.     She was compliant with home exercise program.  Response to previous treatment: ongoing  Functional change: ongoing    Pain: 4/10   Location: right Upper extremity      OBJECTIVE     Objective Measures updated at progress report unless specified.     Treatment       Amanda received the treatments listed below:      Manual therapy techniques: Joint mobilizations were applied to the: cervical and thoracic spine for 8 minutes, including:    Prone CTJ gapping grade III  Manual nerve glides median    Therapeutic exercises to develop strength, endurance, ROM, and flexibility for 8 minutes including:    Side Lying open book x 20 Bilateral   Seated thoracic extensions x 20    Neuromuscular re-education activities to improve: Balance, Coordination, Kinesthetic, Sense, and Proprioception for 15 minutes. The following activities were included:    Prone middle trap level 2, 3 x 10  Red band Internal  Rotation/External Rotation x 30 each   Yellow band horizontal abduction 3 x 10    Therapeutic activities to improve functional performance for 23 minutes, including:    Upper Body Ergometer 4'/4' for activity tolerance level 2  Wall slides red band x 30  Land mine press x 30 Bilateral 8 pounds   Yellow band lat pull down 3 x 10  Y lift off red band x 20     Patient Education and Home Exercises     Home Exercises Provided and Patient Education Provided     Education provided:   - Home Exercise Program     Written Home Exercises Provided: Patient instructed to cont prior HEP. Exercises were reviewed and Amanda was able to demonstrate them prior to the end of the session.  Amanda demonstrated good  understanding of the education provided. See EMR under Patient Instructions for exercises provided during therapy sessions    ASSESSMENT     Amanda is responding well to nerve glides with reduction in symptoms both at home and here in clinic. Continued focus on improving upwards rotation to reduce stress to cervical  spine. Will progress as able.     Amanda is progressing well towards her goals.     Pt prognosis is Good.     Pt will continue to benefit from skilled outpatient physical therapy to address the deficits listed in the problem list box on initial evaluation, provide pt/family education and to maximize pt's level of independence in the home and community environment.     Pt's spiritual, cultural and educational needs considered and pt agreeable to plan of care and goals.     Anticipated barriers to physical therapy: age, BMI     Goals:   Short Term Goals (5 Weeks): -Progressing, not met   1. Patient will be independent with Home Exercise Program to supplement PT in improving pain free cervical mobility  2. Patient will improve cervical Active Range of Motion 10 deg in rotation to improve cervical mobility for driving  3. Patient will improve Upper extremity MMTs by 1/2 grade in all planes to improve strength for  lifting and carrying tasks.  4. Patient will demonstrate improved sitting posture to decrease pain experienced in head and neck.     Long Term Goals (10 Weeks): -Progressing, not met   1. Patient will improve FOTO to predicted level to show true functional improvement.  2. Patient will improve cervical AROM to WNL in all planes to improve cervical mobility for driving   3. Patient will improve UE MMTs 1 grade in all planes to improve strength for lifting and carrying tasks.  4. Patient will report no pain with lifting 30 lbs to promote physical activity.   5. Pt will report no pain with cervical AROM in all planes to promote QOL.  6. Patient goal:  to know what to be able to do to relieve the pain     PLAN   Plan of care Certification: 9/4/2024 to 10/30/2024.     Progress as tolerated with emphasis on offloading nervous system.     Frida Valerio, PTA

## 2024-10-02 ENCOUNTER — TELEPHONE (OUTPATIENT)
Dept: FAMILY MEDICINE | Facility: CLINIC | Age: 64
End: 2024-10-02
Payer: MEDICARE

## 2024-10-02 NOTE — TELEPHONE ENCOUNTER
Received a fax patient's insurance will not cover Zepbound, she must fail Saxenda and Wegovy before they will approve Zepbound

## 2024-10-03 ENCOUNTER — DOCUMENTATION ONLY (OUTPATIENT)
Dept: REHABILITATION | Facility: HOSPITAL | Age: 64
End: 2024-10-03

## 2024-10-03 ENCOUNTER — CLINICAL SUPPORT (OUTPATIENT)
Dept: REHABILITATION | Facility: HOSPITAL | Age: 64
End: 2024-10-03
Payer: MEDICARE

## 2024-10-03 DIAGNOSIS — R29.3 POSTURE ABNORMALITY: Primary | ICD-10-CM

## 2024-10-03 DIAGNOSIS — M25.60 RANGE OF MOTION DEFICIT: ICD-10-CM

## 2024-10-03 PROCEDURE — 97530 THERAPEUTIC ACTIVITIES: CPT | Mod: PN

## 2024-10-03 PROCEDURE — 97110 THERAPEUTIC EXERCISES: CPT | Mod: PN

## 2024-10-03 PROCEDURE — 97112 NEUROMUSCULAR REEDUCATION: CPT | Mod: PN

## 2024-10-03 NOTE — PROGRESS NOTES
[facility]   Physical Therapy Treatment Note     Name: Amanda Webb [facility] Number: [id number]    Therapy Diagnosis:   Encounter Diagnoses   Name Primary?    Posture abnormality Yes    Range of motion deficit            Physician: Adi Cevallos MD    Visit Date: 10/3/2024    Physician Orders: PT Eval and Treat  Medical Diagnosis from Referral:   Diagnosis   M54.2 (ICD-10-CM) - Cervicalgia   M54.12 (ICD-10-CM) - Cervical radiculitis      Evaluation Date: 9/4/2024  Authorization Period Expiration:     12/31/2024      Plan of Care Expiration: 10/30/2024  Progress Note Due: 10/4/2024  Visit # / Visits authorized: 6/ 20  FOTO: 1/ 3    FOTO 1st:   FOTO 3rd:  FOTO 10th:    Time in: 1000am  Time out: 1054 am  Total Billable Time: 54 minutes  Supervised by a licensed physical therapist, who served as extender     Precautions:  Standard       SUBJECTIVE     Pt reports: Has been consistent with performing nerve glides at home. Reports her finger is no longer a constant state of numb it now tingles.     She was compliant with home exercise program.  Response to previous treatment: ongoing  Functional change: ongoing    Pain: 4/10   Location: right Upper extremity      OBJECTIVE     Objective Measures updated at progress report unless specified.     Treatment       Amanda received the treatments listed below:      Manual therapy techniques: Joint mobilizations were applied to the: cervical and thoracic spine for 8 minutes, including:    Prone CTJ gapping grade III  Manual nerve glides median    Therapeutic exercises to develop strength, endurance, ROM, and flexibility for 8 minutes including:    Side Lying open book x 20 Bilateral   Seated thoracic extensions x 20    Neuromuscular re-education activities to improve: Balance, Coordination, Kinesthetic, Sense, and Proprioception for 15 minutes. The following activities were included:    Prone middle trap level 2, 3 x 10  Red band Internal  Rotation/External Rotation x 30 each   Yellow band horizontal abduction 3 x 10    Therapeutic activities to improve functional performance for 23 minutes, including:    Upper Body Ergometer 4'/4' for activity tolerance level 2  Wall slides red band x 30  Land mine press x 30 Bilateral 8 pounds   Yellow band lat pull down 3 x 10  Y lift off red band x 20     Patient Education and Home Exercises     Home Exercises Provided and Patient Education Provided     Education provided:   - Home Exercise Program     Written Home Exercises Provided: Patient instructed to cont prior HEP. Exercises were reviewed and Amanda was able to demonstrate them prior to the end of the session.  Amanda demonstrated good  understanding of the education provided. See EMR under Patient Instructions for exercises provided during therapy sessions    ASSESSMENT     Amanda presented with minimal symptoms in her hands today, just some numbness and tingling. She did well with interventions. Median nerve slider given for Home Exercise Program.     Amanda is progressing well towards her goals.     Pt prognosis is Good.     Pt will continue to benefit from skilled outpatient physical therapy to address the deficits listed in the problem list box on initial evaluation, provide pt/family education and to maximize pt's level of independence in the home and community environment.     Pt's spiritual, cultural and educational needs considered and pt agreeable to plan of care and goals.     Anticipated barriers to physical therapy: age, BMI     Goals:   Short Term Goals (5 Weeks): -Progressing, not met   1. Patient will be independent with Home Exercise Program to supplement PT in improving pain free cervical mobility  2. Patient will improve cervical Active Range of Motion 10 deg in rotation to improve cervical mobility for driving  3. Patient will improve Upper extremity MMTs by 1/2 grade in all planes to improve strength for lifting and carrying tasks.  4.  Patient will demonstrate improved sitting posture to decrease pain experienced in head and neck.     Long Term Goals (10 Weeks): -Progressing, not met   1. Patient will improve FOTO to predicted level to show true functional improvement.  2. Patient will improve cervical AROM to WNL in all planes to improve cervical mobility for driving   3. Patient will improve UE MMTs 1 grade in all planes to improve strength for lifting and carrying tasks.  4. Patient will report no pain with lifting 30 lbs to promote physical activity.   5. Pt will report no pain with cervical AROM in all planes to promote QOL.  6. Patient goal:  to know what to be able to do to relieve the pain     PLAN   Plan of care Certification: 9/4/2024 to 10/30/2024.     Progress as tolerated with emphasis on offloading nervous system.     Sandhya Cooney, PT , DPT  Board Certified Clinical Specialist in Orthopedic Physical Therapy

## 2024-10-08 NOTE — TELEPHONE ENCOUNTER
No care due was identified.  [facility] Embedded Care Due Messages. Reference number: [id number].   10/08/2024 9:05:12 AM CDT

## 2024-10-09 RX ORDER — SEMAGLUTIDE 2.4 MG/.75ML
INJECTION, SOLUTION SUBCUTANEOUS
Qty: 4 ML | Refills: 0 | Status: SHIPPED | OUTPATIENT
Start: 2024-10-09

## 2024-10-09 RX ORDER — SEMAGLUTIDE 2.4 MG/.75ML
INJECTION, SOLUTION SUBCUTANEOUS
Qty: 6 ML | Refills: 1 | OUTPATIENT
Start: 2024-10-09

## 2024-10-15 RX ORDER — SEMAGLUTIDE 2.4 MG/.75ML
INJECTION, SOLUTION SUBCUTANEOUS
Qty: 4 ML | Refills: 0 | Status: CANCELLED | OUTPATIENT
Start: 2024-10-15

## 2024-10-15 NOTE — TELEPHONE ENCOUNTER
No care due was identified.  [facility] Embedded Care Due Messages. Reference number: [id number].   10/15/2024 7:51:49 AM CDT

## 2024-10-16 ENCOUNTER — PATIENT MESSAGE (OUTPATIENT)
Dept: FAMILY MEDICINE | Facility: CLINIC | Age: 64
End: 2024-10-16
Payer: MEDICARE

## 2024-11-13 DIAGNOSIS — E66.01 MORBID OBESITY WITH BMI OF 40.0-44.9, ADULT: ICD-10-CM

## 2024-11-13 DIAGNOSIS — Z51.81 THERAPEUTIC DRUG MONITORING: ICD-10-CM

## 2024-11-13 DIAGNOSIS — R73.03 PREDIABETES: Primary | ICD-10-CM

## 2024-11-13 RX ORDER — SEMAGLUTIDE 2.4 MG/.75ML
INJECTION, SOLUTION SUBCUTANEOUS
Qty: 4 ML | Refills: 0 | Status: SHIPPED | OUTPATIENT
Start: 2024-11-13

## 2024-11-13 NOTE — TELEPHONE ENCOUNTER
Care Due:                  Date            Visit Type   Department     Provider  --------------------------------------------------------------------------------                                EP -                              PRIMARY      [facility]  Last Visit: 07-      CARE ([facility])   PRACTICE       Brennon Cruz  Next Visit: None Scheduled  None         None Found                                                            Last  Test          Frequency    Reason                     Performed    Due Date  --------------------------------------------------------------------------------    HBA1C.......  6 months...  semaglutide,.............  07- 01-    [facility] Embedded Care Due Messages. Reference number: [id number].   11/13/2024 1:12:40 PM CST

## 2024-11-13 NOTE — TELEPHONE ENCOUNTER
Provider Staff:  Action required for this patient    Requires labs      Please see care gap opportunities below in Care Due Message.    Thanks!  [facility]     Appointments      Date Provider   Last Visit   Visit date not found Rajesh Root Jr., MD   Next Visit   Visit date not found Rajesh Root Jr., MD     Refill Decision Note   Amanda Clark  is requesting a refill authorization.  Brief Assessment and Rationale for Refill:  Approve     Medication Therapy Plan:         Comments:     Note composed:1:20 PM 11/13/2024

## 2024-11-14 NOTE — TELEPHONE ENCOUNTER
We need a BMP and an A1c done.  Orders are in.    I would like to get a weight and BMI on her to see how she is doing, she needs to be losing weight to stay on the Wegovy.  Nurses check is okay for this, perhaps on the day she goes to the lab?

## 2024-12-06 NOTE — TELEPHONE ENCOUNTER
No care due was identified.  [facility] Embedded Care Due Messages. Reference number: [id number].   12/06/2024 6:10:47 AM CST

## 2024-12-08 RX ORDER — SEMAGLUTIDE 2.4 MG/.75ML
INJECTION, SOLUTION SUBCUTANEOUS
Qty: 4 ML | Refills: 0 | Status: SHIPPED | OUTPATIENT
Start: 2024-12-08

## 2024-12-09 NOTE — TELEPHONE ENCOUNTER
Refill Decision Note   Amanda Eduardo  is requesting a refill authorization.  Brief Assessment and Rationale for Refill:  Approve     Medication Therapy Plan:         Comments:     Note composed:6:48 PM 12/08/2024

## 2024-12-18 ENCOUNTER — TELEPHONE (OUTPATIENT)
Dept: FAMILY MEDICINE | Facility: CLINIC | Age: 64
End: 2024-12-18

## 2024-12-18 ENCOUNTER — CLINICAL SUPPORT (OUTPATIENT)
Dept: FAMILY MEDICINE | Facility: CLINIC | Age: 64
End: 2024-12-18
Payer: MEDICARE

## 2024-12-18 ENCOUNTER — LAB VISIT (OUTPATIENT)
Dept: LAB | Facility: HOSPITAL | Age: 64
End: 2024-12-18
Attending: FAMILY MEDICINE
Payer: MEDICARE

## 2024-12-18 VITALS — WEIGHT: 236 LBS | BODY MASS INDEX: 41.81 KG/M2

## 2024-12-18 DIAGNOSIS — R73.03 PREDIABETES: ICD-10-CM

## 2024-12-18 DIAGNOSIS — E66.01 MORBID OBESITY WITH BMI OF 40.0-44.9, ADULT: ICD-10-CM

## 2024-12-18 DIAGNOSIS — E66.9 OBESITY, UNSPECIFIED CLASS, UNSPECIFIED OBESITY TYPE, UNSPECIFIED WHETHER SERIOUS COMORBIDITY PRESENT: Primary | ICD-10-CM

## 2024-12-18 DIAGNOSIS — Z51.81 THERAPEUTIC DRUG MONITORING: ICD-10-CM

## 2024-12-18 LAB
ANION GAP SERPL CALC-SCNC: 5 MMOL/L (ref 8–16)
BUN SERPL-MCNC: 15 MG/DL (ref 8–23)
CALCIUM SERPL-MCNC: 9.2 MG/DL (ref 8.7–10.5)
CHLORIDE SERPL-SCNC: 106 MMOL/L (ref 95–110)
CO2 SERPL-SCNC: 27 MMOL/L (ref 23–29)
CREAT SERPL-MCNC: 0.7 MG/DL (ref 0.5–1.4)
EST. GFR  (NO RACE VARIABLE): >60 ML/MIN/1.73 M^2
ESTIMATED AVG GLUCOSE: 105 MG/DL (ref 68–131)
GLUCOSE SERPL-MCNC: 92 MG/DL (ref 70–110)
HBA1C MFR BLD: 5.3 % (ref 4–5.6)
POTASSIUM SERPL-SCNC: 4.3 MMOL/L (ref 3.5–5.1)
SODIUM SERPL-SCNC: 138 MMOL/L (ref 136–145)

## 2024-12-18 PROCEDURE — 83036 HEMOGLOBIN GLYCOSYLATED A1C: CPT | Performed by: FAMILY MEDICINE

## 2024-12-18 PROCEDURE — 80048 BASIC METABOLIC PNL TOTAL CA: CPT | Performed by: FAMILY MEDICINE

## 2024-12-18 NOTE — TELEPHONE ENCOUNTER
Since starting on the semaglutide in April of 2023 she is down 38 lb.  Since going to the maximum dose of 2.4 mg in January of 2024 she is down 4 lb.    Is it worth it?

## 2025-01-13 ENCOUNTER — OFFICE VISIT (OUTPATIENT)
Dept: FAMILY MEDICINE | Facility: CLINIC | Age: 65
End: 2025-01-13
Payer: MEDICARE

## 2025-01-13 VITALS
OXYGEN SATURATION: 99 % | BODY MASS INDEX: 42.77 KG/M2 | TEMPERATURE: 98 F | SYSTOLIC BLOOD PRESSURE: 116 MMHG | WEIGHT: 241.38 LBS | HEART RATE: 84 BPM | DIASTOLIC BLOOD PRESSURE: 64 MMHG | HEIGHT: 63 IN | RESPIRATION RATE: 16 BRPM

## 2025-01-13 DIAGNOSIS — G47.30 SLEEP APNEA, UNSPECIFIED TYPE: ICD-10-CM

## 2025-01-13 DIAGNOSIS — E66.01 MORBID OBESITY WITH BMI OF 40.0-44.9, ADULT: ICD-10-CM

## 2025-01-13 DIAGNOSIS — B35.1 ONYCHOMYCOSIS: ICD-10-CM

## 2025-01-13 DIAGNOSIS — R73.03 PREDIABETES: Primary | ICD-10-CM

## 2025-01-13 PROCEDURE — 99999 PR PBB SHADOW E&M-EST. PATIENT-LVL IV: CPT | Mod: PBBFAC,,,

## 2025-01-13 PROCEDURE — 99214 OFFICE O/P EST MOD 30 MIN: CPT | Mod: PBBFAC,PO

## 2025-01-13 PROCEDURE — 99214 OFFICE O/P EST MOD 30 MIN: CPT | Mod: S$PBB,,,

## 2025-01-13 RX ORDER — FLUCONAZOLE 200 MG/1
TABLET ORAL
Qty: 8 TABLET | Refills: 0 | Status: SHIPPED | OUTPATIENT
Start: 2025-01-13

## 2025-01-13 RX ORDER — SEMAGLUTIDE 2.4 MG/.75ML
INJECTION, SOLUTION SUBCUTANEOUS
Qty: 4 ML | Refills: 3 | Status: SHIPPED | OUTPATIENT
Start: 2025-01-13

## 2025-01-13 NOTE — PROGRESS NOTES
Subjective:       Patient ID:  [id number]     Chief Complaint: Medication Refill      History of Present Illness    Ms. Clark presents today for medication refill and prior authorization for Wegovy. She is currently taking Wegovy 2.4 mg weekly, Diflucan and a solution for toenail fungus, and Zyrtec for allergies. She has been taking Diflucan since March of last year with gradual improvement in toenail fungus, noting healthy nail growth. She is currently out of Diflucan. She has a history of prediabetes, high cholesterol, and psoriasis. She uses a CPAP machine. She reports frequent sneezing, particularly in the morning. She is single, , and retired from government employment.     No other concerns today.    Past Medical History:   Diagnosis Date    Anxiety     Arthritis     Back pain     Cancer     precancerous skin lesion    Depression     Ganglion cyst of wrist, left 07/08/2019    Dr Alves     Herniated disc     L-4     Hyperlipidemia     Knee pain     Prediabetes 06/30/2022    Psoriasis     Sleep apnea, unspecified 02/10/2020      Active Problem List with Overview Notes    Diagnosis Date Noted    Posture abnormality 09/09/2024    Range of motion deficit 09/09/2024    Immunization counseling 03/28/2023    Travel advice encounter 03/28/2023    Prediabetes 06/30/2022    Morbid obesity due to excess calories 11/09/2016    Psoriasis     Obesity, unspecified 05/05/2014    Morbid obesity with BMI of 40.0-44.9, adult 05/05/2014    Hyperlipidemia     Arthritis       Review of patient's allergies indicates:   Allergen Reactions    No known drug allergies          Current Outpatient Medications:     fdpbwyv-aohgwal-wyusqcu-terbin (DIFMETIOXRIME) 4-2-1-4 % Soln, Apply to diseased nail once daily, Disp: 15 g, Rfl: 5    timolol maleate 0.5% (TIMOPTIC) 0.5 % Drop, Place 1 drop into both eyes 2 (two) times daily., Disp: , Rfl:     fluconazole (DIFLUCAN) 200 MG Tab, Take 1 tablet by mouth once a week, Disp: 8 tablet, Rfl: 0     semaglutide, weight loss, (WEGOVY) 2.4 mg/0.75 mL PnIj, INJECT 2.4MG INTO THE SKIN EVERY 7 DAYS, Disp: 4 mL, Rfl: 3    Lab Results   Component Value Date    WBC 6.49 07/22/2024    HGB 13.0 07/22/2024    HCT 40.2 07/22/2024     07/22/2024    CHOL 170 07/22/2024    TRIG 118 07/22/2024    HDL 44 07/22/2024    ALT 16 07/22/2024    AST 17 07/22/2024     12/18/2024    K 4.3 12/18/2024     12/18/2024    CREATININE 0.7 12/18/2024    BUN 15 12/18/2024    CO2 27 12/18/2024    TSH 1.732 08/22/2019    INR 1.0 10/16/2015    HGBA1C 5.3 12/18/2024       Review of Systems   Constitutional:  Negative for fatigue and fever.   HENT: Negative.  Negative for congestion, sneezing and sore throat.    Eyes: Negative.    Respiratory:  Negative for cough, shortness of breath and wheezing.    Cardiovascular:  Negative for chest pain, palpitations and leg swelling.   Gastrointestinal:  Negative for abdominal pain, nausea and vomiting.   Genitourinary: Negative.    Musculoskeletal: Negative.  Negative for arthralgias.   Skin: Negative.  Negative for rash.   Neurological:  Negative for dizziness, weakness, light-headedness, numbness and headaches.   Hematological: Negative.    Psychiatric/Behavioral: Negative.         Objective:      Physical Exam  Constitutional:       Appearance: Normal appearance.   HENT:      Head: Normocephalic and atraumatic.      Nose: Nose normal.   Eyes:      Extraocular Movements: Extraocular movements intact.   Cardiovascular:      Rate and Rhythm: Normal rate and regular rhythm.   Pulmonary:      Effort: Pulmonary effort is normal.      Breath sounds: Normal breath sounds.   Musculoskeletal:         General: Normal range of motion.      Cervical back: Normal range of motion.   Skin:     General: Skin is warm and dry.   Neurological:      General: No focal deficit present.      Mental Status: She is alert and oriented to person, place, and time.   Psychiatric:         Mood and Affect: Mood normal.          Assessment:       1. Prediabetes    2. Morbid obesity with BMI of 40.0-44.9, adult    3. Onychomycosis    4. Sleep apnea, unspecified type        Plan:       Amanda was seen today for medication refill.    Diagnoses and all orders for this visit:    Prediabetes  -     semaglutide, weight loss, (WEGOVY) 2.4 mg/0.75 mL PnIj; INJECT 2.4MG INTO THE SKIN EVERY 7 DAYS  - Low carb diet and exercise     Morbid obesity with BMI of 40.0-44.9, adult  -     semaglutide, weight loss, (WEGOVY) 2.4 mg/0.75 mL PnIj; INJECT 2.4MG INTO THE SKIN EVERY 7 DAYS    Onychomycosis  -     fluconazole (DIFLUCAN) 200 MG Tab; Take 1 tablet by mouth once a week  - Advised follow-up with derm     Sleep apnea, unspecified type  -     Ambulatory referral/consult to Pulmonology; Future           No other concerns today       Future Appointments       Date Provider Specialty Appt Notes    6/17/2025 Sharon Werner MD Dermatology Check up for skin cancer    8/11/2025 Maribel Mayo MD Family Medicine annual est               Portions of this note were dictated using voice recognition software and may contain dictation related errors in spelling / grammar / syntax not discovered on text review.     Nicole Florian PA-C

## 2025-01-28 ENCOUNTER — PATIENT MESSAGE (OUTPATIENT)
Dept: FAMILY MEDICINE | Facility: CLINIC | Age: 65
End: 2025-01-28
Payer: MEDICARE

## 2025-02-05 ENCOUNTER — PATIENT MESSAGE (OUTPATIENT)
Dept: FAMILY MEDICINE | Facility: CLINIC | Age: 65
End: 2025-02-05
Payer: MEDICARE

## 2025-02-06 ENCOUNTER — TELEPHONE (OUTPATIENT)
Dept: FAMILY MEDICINE | Facility: CLINIC | Age: 65
End: 2025-02-06
Payer: MEDICARE

## 2025-02-06 NOTE — TELEPHONE ENCOUNTER
Prior authorization form completed with patient's assistance regarding some of the questions.  Form given to in office staff member PAT Jones, as one question on the form must be answered by provider REEMA Florian.

## 2025-02-06 NOTE — TELEPHONE ENCOUNTER
Estrellita Mei Staff                                                   ----- Message from Estrellita sent at 2/6/2025 12:57 PM CST -----  Type:  Needs Medical Advice    Who Called: patient    Would the patient rather a call back or a response via [facility]? Please call    Best Call Back Number: [phone]    Additional Information: Prior approval for medication   Please call back to advise. Thanks!

## 2025-02-13 ENCOUNTER — PATIENT MESSAGE (OUTPATIENT)
Dept: FAMILY MEDICINE | Facility: CLINIC | Age: 65
End: 2025-02-13
Payer: MEDICARE

## 2025-04-14 NOTE — TELEPHONE ENCOUNTER
Here for routine OB appt at 31w4d, with no complaints.  Reports good FM.  Denies LOF, denies VB, denies contractions.  Reviewed warning signs of Labor and Preeclampsia.  Daily FM counts reinforced.  Growth scan and recheck of LLP on 4/25  Received Tdap, RSV not covered by insurance  RTC @ 34 weeks   Hold the sulindac.  Everything else is okay

## 2025-04-25 ENCOUNTER — OFFICE VISIT (OUTPATIENT)
Dept: PULMONOLOGY | Facility: CLINIC | Age: 65
End: 2025-04-25
Payer: MEDICARE

## 2025-04-25 VITALS
OXYGEN SATURATION: 95 % | HEART RATE: 80 BPM | BODY MASS INDEX: 42.52 KG/M2 | HEIGHT: 63 IN | WEIGHT: 240 LBS | DIASTOLIC BLOOD PRESSURE: 63 MMHG | SYSTOLIC BLOOD PRESSURE: 114 MMHG

## 2025-04-25 DIAGNOSIS — G47.33 OBSTRUCTIVE SLEEP APNEA SYNDROME: Primary | ICD-10-CM

## 2025-04-25 PROCEDURE — 99214 OFFICE O/P EST MOD 30 MIN: CPT | Mod: PBBFAC,PO | Performed by: NURSE PRACTITIONER

## 2025-04-25 PROCEDURE — 99999 PR PBB SHADOW E&M-EST. PATIENT-LVL IV: CPT | Mod: PBBFAC,,, | Performed by: NURSE PRACTITIONER

## 2025-04-25 RX ORDER — TIMOLOL MALEATE 5 MG/ML
1 SOLUTION OPHTHALMIC
COMMUNITY
Start: 2025-03-25

## 2025-04-25 NOTE — PROGRESS NOTES
4/25/2025    [facility]  New Patient Consult    Chief Complaint   Patient presents with    Sleep Apnea     Ashtyn pt          HPI:  4/25/25  Patient has been using CPAP therapy for 5 years. She received her current CPAP machine around March 2020, just before the COVID-19 shutdown. This machine was part of a recall about 3 years ago, and she received a refurbished replacement. Her current CPAP machine is making intermittent, bothersome noise, especially when sharing a room with family members. She also reports severe dry mouth upon waking, which has been a more recent development. Dr. Franklin provided her with a strap, but she still has dry mouth and sometimes feels that air is escaping from her mouth instead of flowing through her nose and into her lungs. The dryness is severe, causing her lips to adhere to her teeth. She has attempted to adjust the flex settings on her machine to eliminate the noise, but this was unsuccessful. She reports being compliant with her CPAP therapy, using it nightly, and notes significant improvement in her sleep quality. She used to wake up 4-5 times a night, attributing it to her bladder, but this has resolved with CPAP use. Compliance reviewed > 4 hours 100% AHI 1.2.     She denies complaints of daytime fatigue, mental grogginess, or forgetfulness.    TEST RESULTS:  Patient underwent a sleep study on 1/6/2020, which revealed an Apnea hypopnea index of 21.7. She also completed a CPAP titration study, with the exact date not specified but likely around March 2020.    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[employer]      The chief compliant  problem is new to me  PFSH:  Past Medical History:   Diagnosis Date    Anxiety     Arthritis     Back pain     Cancer     precancerous skin lesion    Depression     Ganglion cyst of wrist, left 07/08/2019    Dr Alves     Herniated disc     L-4      Hyperlipidemia     Knee pain     Prediabetes 2022    Psoriasis     Sleep apnea, unspecified 02/10/2020         Past Surgical History:   Procedure Laterality Date    ADENOIDECTOMY      arthroscopic left knee      BACK injections       & 2013    CARPAL TUNNEL RELEASE Right 2020     SECTION      COLONOSCOPY  1/25/10    Dr. Coronado, 10 year recheck    COLONOSCOPY N/A 2023    Procedure: COLONOSCOPY;  Surgeon: Obdulia Valentine MD;  Location: [facility];  Service: Endoscopy;  Laterality: N/A;  ppm sent    EYE SURGERY Bilateral 2022    bilateral cataract surgery     JOINT REPLACEMENT  2016    right knee     JOINT REPLACEMENT  2015    left knee     LAMINOTOMY  2015    Dr Lazo    STEROID INJECTION KNEE      TONSILLECTOMY      vocal cord nodule removal       Social History[1]  Family History   Problem Relation Name Age of Onset    Heart disease Mother      Diabetes Mother      Psoriasis Mother      Macular degeneration Mother      Stroke Mother      Hearing loss Mother      Arthritis Mother      Cancer Father          bone, prostate    Heart disease Sister      Diabetes Sister      Hypertension Sister      Depression Sister      Macular degeneration Sister      No Known Problems Daughter      Heart disease Maternal Aunt      Dementia Maternal Aunt      Cancer Paternal Uncle          tumor abdominal    Heart disease Paternal Uncle      Dementia Paternal Uncle      Melanoma Neg Hx      Lupus Neg Hx      Eczema Neg Hx       Review of patient's allergies indicates:   Allergen Reactions    No known drug allergies      I have reviewed past medical, family, and social history. I have reviewed previous nurse notes.        Performance Status:The patient's activity level is no limits with regular activity.           Review of Systems:  a review of eleven systems covering constitutional, Eye, HEENT, Psych, Respiratory, Cardiac, GI, , Musculoskeletal, Endocrine, Dermatologic  "was negative except for pertinent findings as listed ABOVE and below: pertinent positive as above, rest is good  ROS:  General: -fatigue  ENT: +dry mouth          Exam:Comprehensive exam done. /63 (BP Location: Right arm, Patient Position: Sitting)   Pulse 80   Ht 5' 3" (1.6 m)   Wt 108.8 kg (239 lb 15.5 oz)   LMP 07/04/2011   SpO2 95% Comment: on room air at rest  BMI 42.51 kg/m²   Exam included Vitals as listed  Physical Exam    General: No acute distress. Well-developed. Well-nourished.  Eyes: EOMI. Sclerae anicteric.  HENT: Normocephalic. Atraumatic. Nares patent. Moist oral mucosa.  Ears: Bilateral TMs clear. Bilateral EACs clear.  Cardiovascular: Regular rate. Regular rhythm. No murmurs. No rubs. No gallops. Normal S1, S2.  Respiratory: Normal respiratory effort. Clear to auscultation bilaterally. No rales. No rhonchi. No wheezing.  Abdomen: Soft. Non-tender. Non-distended. Normoactive bowel sounds.  Musculoskeletal: No  obvious deformity.  Extremities: No lower extremity edema.  Neurological: Alert & oriented x3. No slurred speech. Normal gait.  Psychiatric: Normal mood. Normal affect. Good insight. Good judgment.  Skin: Warm. Dry. No rash.         Radiographs (ct chest and cxr) reviewed: CXR  Report reviewed  CXR 10/16/15 lungs clear    Labs: Patients labs reviewed including CBC, TSH,  and C02  reviewed       Lab Results   Component Value Date    WBC 6.49 07/22/2024    RBC 4.31 07/22/2024    HGB 13.0 07/22/2024    HCT 40.2 07/22/2024    MCV 93 07/22/2024    MCH 30.2 07/22/2024    MCHC 32.3 07/22/2024    RDW 13.8 07/22/2024     07/22/2024    MPV 10.3 07/22/2024    GRAN 4.3 07/22/2024    GRAN 66.5 07/22/2024    LYMPH 1.4 07/22/2024    LYMPH 21.9 07/22/2024    MONO 0.5 07/22/2024    MONO 8.0 07/22/2024    EOS 0.2 07/22/2024    BASO 0.07 07/22/2024    EOSINOPHIL 2.3 07/22/2024    BASOPHIL 1.1 07/22/2024      Latest Reference Range & Units 07/19/23 09:42 07/22/24 09:25 12/18/24 08:11   CO2 23 - " 29 mmol/L       Latest Reference Range & Units 19 16:16   TSH 0.400 - 4.000 uIU/mL 1.732         PFT was not done  Pulmonary Functions Testing Results:        Plan:  Clinical impression is apparently straight forward and impression with management as below.    Amanda was seen today for sleep apnea.    Diagnoses and all orders for this visit:    Obstructive sleep apnea syndrome  -     Ambulatory referral/consult to Pulmonology  -     CPAP FOR HOME USE          Assessment & Plan    PLAN SUMMARY:  - Ordered new CPAP machine with pressure range 10-16 cmH2O  - Prescribed new CPAP machine to address noise and dry mouth issues  - Virtual follow-up visit in 3 months to assess new CPAP efficacy and patient satisfaction    Follow up in about 3 months (around 2025), or if symptoms worsen or fail to improve, for Virtual Visit.      Discussed with patient above for education the following:      There are no Patient Instructions on file for this visit.    This note was generated with the assistance of ambient listening technology. Verbal consent was obtained by the patient and accompanying visitor(s) for the recording of patient appointment to facilitate this note. I attest to having reviewed and edited the generated note for accuracy, though some syntax or spelling errors may persist. Please contact the author of this note for any clarification.           [1]   Social History  Tobacco Use    Smoking status: Former     Current packs/day: 0.00     Types: Cigarettes     Quit date: 1988     Years since quittin.1    Smokeless tobacco: Never   Substance Use Topics    Alcohol use: Yes     Comment: sometimes    Drug use: No

## 2025-05-07 DIAGNOSIS — E66.01 MORBID OBESITY WITH BMI OF 40.0-44.9, ADULT: ICD-10-CM

## 2025-05-07 DIAGNOSIS — R73.03 PREDIABETES: ICD-10-CM

## 2025-05-07 RX ORDER — SEMAGLUTIDE 2.4 MG/.75ML
INJECTION, SOLUTION SUBCUTANEOUS
Qty: 4 ML | Refills: 3 | Status: SHIPPED | OUTPATIENT
Start: 2025-05-07

## 2025-06-17 ENCOUNTER — OFFICE VISIT (OUTPATIENT)
Dept: DERMATOLOGY | Facility: CLINIC | Age: 65
End: 2025-06-17
Payer: MEDICARE

## 2025-06-17 VITALS — WEIGHT: 239.88 LBS | BODY MASS INDEX: 42.49 KG/M2

## 2025-06-17 DIAGNOSIS — L81.4 SOLAR LENTIGO: ICD-10-CM

## 2025-06-17 DIAGNOSIS — L82.1 SEBORRHEIC KERATOSES: ICD-10-CM

## 2025-06-17 DIAGNOSIS — L57.8 ACTINIC SKIN DAMAGE: ICD-10-CM

## 2025-06-17 DIAGNOSIS — L57.0 ACTINIC KERATOSES: Primary | ICD-10-CM

## 2025-06-17 DIAGNOSIS — D18.01 CHERRY ANGIOMA: ICD-10-CM

## 2025-06-17 PROCEDURE — 99213 OFFICE O/P EST LOW 20 MIN: CPT | Mod: 25,S$GLB,, | Performed by: DERMATOLOGY

## 2025-06-17 PROCEDURE — 17003 DESTRUCT PREMALG LES 2-14: CPT | Mod: S$GLB,,, | Performed by: DERMATOLOGY

## 2025-06-17 PROCEDURE — 17000 DESTRUCT PREMALG LESION: CPT | Mod: S$GLB,,, | Performed by: DERMATOLOGY

## 2025-07-10 ENCOUNTER — TELEPHONE (OUTPATIENT)
Dept: DERMATOLOGY | Facility: CLINIC | Age: 65
End: 2025-07-10
Payer: MEDICARE

## 2025-07-10 NOTE — TELEPHONE ENCOUNTER
Called and got patient r/s    Copied from CRM #[id number]. Topic: Appointments - Appointment Access  >> Jul 10, 2025  2:51 PM Nani wrote:  Type:  Sooner Apoointment Request    Caller is requesting a sooner appointment.  Caller declined first available appointment listed below.  Caller will not accept being placed on the waitlist and is requesting a message be sent to doctor.    Name of Caller:Patient     When is the first available appointment?7/18/25     Symptoms:Benign removal     Would the patient rather a call back or a response via [facility]? Call    Best Call Back Number: [phone] (home)    Additional Information: Pt would like to reschedule for a later appt, please call back to advise. Thanks!

## 2025-08-01 ENCOUNTER — OFFICE VISIT (OUTPATIENT)
Dept: PULMONOLOGY | Facility: CLINIC | Age: 65
End: 2025-08-01
Payer: MEDICARE

## 2025-08-01 DIAGNOSIS — G47.33 OBSTRUCTIVE SLEEP APNEA SYNDROME: Primary | ICD-10-CM

## 2025-08-01 NOTE — PROGRESS NOTES
8/1/2025    Amanda Clark  The patient location is: [facility]  The chief complaint leading to consultation is: sleep apnea    Visit type: audiovisual    Face to Face time with patient: 7 minutes  14 minutes of total time spent on the encounter, which includes face to face time and non-face to face time preparing to see the patient (eg, review of tests), Obtaining and/or reviewing separately obtained history, Documenting clinical information in the electronic or other health record, Independently interpreting results (not separately reported) and communicating results to the patient/family/caregiver, or Care coordination (not separately reported).         Each patient to whom he or she provides medical services by telemedicine is:  (1) informed of the relationship between the physician and patient and the respective role of any other health care provider with respect to management of the patient; and (2) notified that he or she may decline to receive medical services by telemedicine and may withdraw from such care at any time.    Notes:       Chief Complaint   Patient presents with    Sleep Apnea       HPI:      8/1/2025- received new CPAP. Likes new CPAP, states quieter that her older one, wakes up feeling like she got more oxygen while sleeping. Using nasal pillow mask with no issues.  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 No complaint of daytime fatigue or mental grogginess. Interested in discussing Inspire therapy. States she like to travel and find the large CPAP to be difficult to travel with. Compliance report reviewed >4 hours 97% with AHI 1. Average pressure 10.4.      4/25/25  Patient has been using CPAP therapy for 5 years. She received her current CPAP machine around March 2020, just before the COVID-19 shutdown. This machine was part of a recall about 3 years ago, and she received a refurbished replacement. Her current CPAP machine is making intermittent, bothersome noise,  especially when sharing a room with family members. She also reports severe dry mouth upon waking, which has been a more recent development. Dr. Franklin provided her with a strap, but she still has dry mouth and sometimes feels that air is escaping from her mouth instead of flowing through her nose and into her lungs. The dryness is severe, causing her lips to adhere to her teeth. She has attempted to adjust the flex settings on her machine to eliminate the noise, but this was unsuccessful. She reports being compliant with her CPAP therapy, using it nightly, and notes significant improvement in her sleep quality. She used to wake up 4-5 times a night, attributing it to her bladder, but this has resolved with CPAP use. Compliance reviewed > 4 hours 100% AHI 1.2.     She denies complaints of daytime fatigue, mental grogginess, or forgetfulness.    TEST RESULTS:  Patient underwent a sleep study on 2020, which revealed an Apnea hypopnea index of 21.7. She also completed a CPAP titration study, with the exact date not specified but likely around 2020.    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 Coronado, 10 year recheck    COLONOSCOPY N/A 2023    Procedure: COLONOSCOPY;  Surgeon: Meme  Obdulia GUTIÉRREZ MD;  Location: [facility];  Service: Endoscopy;  Laterality: N/A;  ppm sent    EYE SURGERY Bilateral 02/2022    bilateral cataract surgery 04/22    JOINT REPLACEMENT  02/18/2016    right knee     JOINT REPLACEMENT  11/03/2015    left knee     LAMINOTOMY  7-    Dr Lazo    STEROID INJECTION KNEE      TONSILLECTOMY      vocal cord nodule removal       Social History[1]  Family History   Problem Relation Name Age of Onset    Heart disease Mother      Diabetes Mother      Psoriasis Mother      Macular degeneration Mother      Stroke Mother      Hearing loss Mother      Arthritis Mother      Cancer Father          bone, prostate    Heart disease Sister      Diabetes Sister      Hypertension Sister      Depression Sister      Macular degeneration Sister      No Known Problems Daughter      Heart disease Maternal Aunt      Dementia Maternal Aunt      Cancer Paternal Uncle          tumor abdominal    Heart disease Paternal Uncle      Dementia Paternal Uncle      Melanoma Neg Hx      Lupus Neg Hx      Eczema Neg Hx       Review of patient's allergies indicates:   Allergen Reactions    No known drug allergies      I have reviewed past medical, family, and social history. I have reviewed previous nurse notes.        Performance Status:The patient's activity level is no limits with regular activity.           Review of Systems:  a review of eleven systems covering constitutional, Eye, HEENT, Psych, Respiratory, Cardiac, GI, , Musculoskeletal, Endocrine, Dermatologic was negative except for pertinent findings as listed ABOVE and below: pertinent positive as above, rest is good           Exam:Comprehensive exam done. LMP 07/04/2011   Exam included Vitals as listed   Constitutional:  oriented to person, place, and time.  appears well-developed. No distress.   Nose: Nose normal.     Eyes: Pupils are equal, round,   Neck: No JVD present. No thyromegaly visible     Pulmonary/Chest: Effort normal and audible  breath sounds normal. No accessory muscle usage or stridor. No apnea and no tachypnea. No respiratory distress,     Skin: tone/color normal. No palor    Psychiatric: normal mood and affect. behavior is normal. Judgment and thought content normal.               Radiographs (ct chest and cxr) reviewed: CXR  Report reviewed  CXR 10/16/15 lungs clear    Labs: Patients labs reviewed including CBC, TSH,  and C02  reviewed       Lab Results   Component Value Date    WBC 6.49 07/22/2024    RBC 4.31 07/22/2024    HGB 13.0 07/22/2024    HCT 40.2 07/22/2024    MCV 93 07/22/2024    MCH 30.2 07/22/2024    MCHC 32.3 07/22/2024    RDW 13.8 07/22/2024     07/22/2024    MPV 10.3 07/22/2024    GRAN 4.3 07/22/2024    GRAN 66.5 07/22/2024    LYMPH 1.4 07/22/2024    LYMPH 21.9 07/22/2024    MONO 0.5 07/22/2024    MONO 8.0 07/22/2024    EOS 0.2 07/22/2024    BASO 0.07 07/22/2024    EOSINOPHIL 2.3 07/22/2024    BASOPHIL 1.1 07/22/2024      Latest Reference Range & Units 07/19/23 09:42 07/22/24 09:25 12/18/24 08:11   CO2 23 - 29 mmol/L 25 25 27      Latest Reference Range & Units 08/22/19 16:16   TSH 0.400 - 4.000 uIU/mL 1.732           Plan:  Clinical impression is apparently straight forward and impression with management as below.    Amanda was seen today for sleep apnea.    Diagnoses and all orders for this visit:    Obstructive sleep apnea syndrome  Comments:  - continue CPAP therapy nightly  - Patient education on alternative [facility] therapies            Assessment & Plan    PLAN SUMMARY:  - Ordered new CPAP machine with pressure range 10-16 cmH2O  - Prescribed new CPAP machine to address noise and dry mouth issues  - Virtual follow-up visit in 3 months to assess new CPAP efficacy and patient satisfaction    Follow up in about 1 year (around 8/1/2026).      Discussed with patient above for education the following:      Patient Instructions   Discussed criteria for INspire, recommend contacting clinic when BMI is below 35 to order  repeat sleep study to see if you qualify    Continue CPAP nightly    Recommend looking into travel CPAP, it is smaller but not covered by insurance. They are sold online through various CPAP supply companies.              [1]   Social History  Tobacco Use    Smoking status: Former     Current packs/day: 0.00     Types: Cigarettes     Quit date: 1988     Years since quittin.4    Smokeless tobacco: Never   Substance Use Topics    Alcohol use: Yes     Comment: sometimes    Drug use: No

## 2025-08-01 NOTE — PATIENT INSTRUCTIONS
Discussed criteria for INspire, recommend contacting clinic when BMI is below 35 to order repeat sleep study to see if you qualify    Continue CPAP nightly    Recommend looking into travel CPAP, it is smaller but not covered by insurance. They are sold online through various CPAP supply companies.

## 2025-08-11 ENCOUNTER — OFFICE VISIT (OUTPATIENT)
Dept: FAMILY MEDICINE | Facility: CLINIC | Age: 65
End: 2025-08-11
Payer: MEDICARE

## 2025-08-11 VITALS
WEIGHT: 242.5 LBS | OXYGEN SATURATION: 99 % | DIASTOLIC BLOOD PRESSURE: 60 MMHG | BODY MASS INDEX: 42.97 KG/M2 | HEIGHT: 63 IN | SYSTOLIC BLOOD PRESSURE: 120 MMHG | HEART RATE: 77 BPM

## 2025-08-11 DIAGNOSIS — Z13.29 SCREENING FOR THYROID DISORDER: ICD-10-CM

## 2025-08-11 DIAGNOSIS — Z12.31 ENCOUNTER FOR SCREENING MAMMOGRAM FOR MALIGNANT NEOPLASM OF BREAST: ICD-10-CM

## 2025-08-11 DIAGNOSIS — H40.059 OCULAR HYPERTENSION, UNSPECIFIED LATERALITY: ICD-10-CM

## 2025-08-11 DIAGNOSIS — L40.9 PSORIASIS: ICD-10-CM

## 2025-08-11 DIAGNOSIS — E66.01 MORBID OBESITY WITH BMI OF 40.0-44.9, ADULT: ICD-10-CM

## 2025-08-11 DIAGNOSIS — R73.03 PREDIABETES: ICD-10-CM

## 2025-08-11 DIAGNOSIS — E78.5 HYPERLIPIDEMIA, UNSPECIFIED HYPERLIPIDEMIA TYPE: ICD-10-CM

## 2025-08-11 DIAGNOSIS — Z00.00 ROUTINE GENERAL MEDICAL EXAMINATION AT A HEALTH CARE FACILITY: Primary | ICD-10-CM

## 2025-08-11 DIAGNOSIS — G47.33 OSA ON CPAP: ICD-10-CM

## 2025-08-11 PROCEDURE — G2211 COMPLEX E/M VISIT ADD ON: HCPCS | Mod: ,,, | Performed by: STUDENT IN AN ORGANIZED HEALTH CARE EDUCATION/TRAINING PROGRAM

## 2025-08-11 PROCEDURE — 99214 OFFICE O/P EST MOD 30 MIN: CPT | Mod: S$PBB,,, | Performed by: STUDENT IN AN ORGANIZED HEALTH CARE EDUCATION/TRAINING PROGRAM

## 2025-08-11 PROCEDURE — 99999 PR PBB SHADOW E&M-EST. PATIENT-LVL III: CPT | Mod: PBBFAC,,, | Performed by: STUDENT IN AN ORGANIZED HEALTH CARE EDUCATION/TRAINING PROGRAM

## 2025-08-11 PROCEDURE — 99213 OFFICE O/P EST LOW 20 MIN: CPT | Mod: PBBFAC,PO | Performed by: STUDENT IN AN ORGANIZED HEALTH CARE EDUCATION/TRAINING PROGRAM

## 2025-08-11 RX ORDER — SEMAGLUTIDE 2.4 MG/.75ML
INJECTION, SOLUTION SUBCUTANEOUS
Qty: 4 ML | Refills: 11 | Status: SHIPPED | OUTPATIENT
Start: 2025-08-11

## 2025-08-19 ENCOUNTER — OFFICE VISIT (OUTPATIENT)
Dept: SPINE | Facility: CLINIC | Age: 65
End: 2025-08-19
Payer: MEDICARE

## 2025-08-19 VITALS — WEIGHT: 242.5 LBS | HEIGHT: 63 IN | BODY MASS INDEX: 42.97 KG/M2

## 2025-08-19 DIAGNOSIS — M54.12 CERVICAL RADICULITIS: Primary | ICD-10-CM

## 2025-08-19 PROCEDURE — 99213 OFFICE O/P EST LOW 20 MIN: CPT | Mod: S$PBB,,, | Performed by: PHYSICAL MEDICINE & REHABILITATION

## 2025-08-19 PROCEDURE — 99999 PR PBB SHADOW E&M-EST. PATIENT-LVL III: CPT | Mod: PBBFAC,,, | Performed by: PHYSICAL MEDICINE & REHABILITATION

## 2025-08-19 PROCEDURE — 99213 OFFICE O/P EST LOW 20 MIN: CPT | Mod: PBBFAC,PN | Performed by: PHYSICAL MEDICINE & REHABILITATION

## 2025-08-21 ENCOUNTER — LAB VISIT (OUTPATIENT)
Dept: LAB | Facility: HOSPITAL | Age: 65
End: 2025-08-21
Attending: STUDENT IN AN ORGANIZED HEALTH CARE EDUCATION/TRAINING PROGRAM
Payer: MEDICARE

## 2025-08-21 DIAGNOSIS — E78.5 HYPERLIPIDEMIA, UNSPECIFIED HYPERLIPIDEMIA TYPE: ICD-10-CM

## 2025-08-21 DIAGNOSIS — R73.03 PREDIABETES: ICD-10-CM

## 2025-08-21 DIAGNOSIS — Z13.29 SCREENING FOR THYROID DISORDER: ICD-10-CM

## 2025-08-21 DIAGNOSIS — Z00.00 ROUTINE GENERAL MEDICAL EXAMINATION AT A HEALTH CARE FACILITY: ICD-10-CM

## 2025-08-21 LAB
ABSOLUTE EOSINOPHIL (OHS): 0.23 K/UL
ABSOLUTE MONOCYTE (OHS): 0.69 K/UL (ref 0.3–1)
ABSOLUTE NEUTROPHIL COUNT (OHS): 4.69 K/UL (ref 1.8–7.7)
ALBUMIN SERPL BCP-MCNC: 3.9 G/DL (ref 3.5–5.2)
ALBUMIN/CREAT UR: 14.9 UG/MG
ALP SERPL-CCNC: 88 UNIT/L (ref 40–150)
ALT SERPL W/O P-5'-P-CCNC: 20 UNIT/L (ref 0–55)
ANION GAP (OHS): 10 MMOL/L (ref 8–16)
AST SERPL-CCNC: 25 UNIT/L (ref 0–50)
BASOPHILS # BLD AUTO: 0.11 K/UL
BASOPHILS NFR BLD AUTO: 1.6 %
BILIRUB SERPL-MCNC: 0.4 MG/DL (ref 0.1–1)
BUN SERPL-MCNC: 15 MG/DL (ref 8–23)
CALCIUM SERPL-MCNC: 9.3 MG/DL (ref 8.7–10.5)
CHLORIDE SERPL-SCNC: 105 MMOL/L (ref 95–110)
CHOLEST SERPL-MCNC: 203 MG/DL (ref 120–199)
CHOLEST/HDLC SERPL: 4.6 {RATIO} (ref 2–5)
CO2 SERPL-SCNC: 22 MMOL/L (ref 23–29)
CREAT SERPL-MCNC: 0.8 MG/DL (ref 0.5–1.4)
CREAT UR-MCNC: 134 MG/DL (ref 15–325)
EAG (OHS): 114 MG/DL (ref 68–131)
ERYTHROCYTE [DISTWIDTH] IN BLOOD BY AUTOMATED COUNT: 13.3 % (ref 11.5–14.5)
GFR SERPLBLD CREATININE-BSD FMLA CKD-EPI: >60 ML/MIN/1.73/M2
GLUCOSE SERPL-MCNC: 94 MG/DL (ref 70–110)
HBA1C MFR BLD: 5.6 % (ref 4–5.6)
HCT VFR BLD AUTO: 42.9 % (ref 37–48.5)
HDLC SERPL-MCNC: 44 MG/DL (ref 40–75)
HDLC SERPL: 21.7 % (ref 20–50)
HGB BLD-MCNC: 13.9 GM/DL (ref 12–16)
IMM GRANULOCYTES # BLD AUTO: 0.02 K/UL (ref 0–0.04)
IMM GRANULOCYTES NFR BLD AUTO: 0.3 % (ref 0–0.5)
LDLC SERPL CALC-MCNC: 126 MG/DL (ref 63–159)
LYMPHOCYTES # BLD AUTO: 1.15 K/UL (ref 1–4.8)
MCH RBC QN AUTO: 30.5 PG (ref 27–31)
MCHC RBC AUTO-ENTMCNC: 32.4 G/DL (ref 32–36)
MCV RBC AUTO: 94 FL (ref 82–98)
MICROALBUMIN UR-MCNC: 20 UG/ML
NONHDLC SERPL-MCNC: 159 MG/DL
NUCLEATED RBC (/100WBC) (OHS): 0 /100 WBC
PLATELET # BLD AUTO: 295 K/UL (ref 150–450)
PMV BLD AUTO: 10.5 FL (ref 9.2–12.9)
POTASSIUM SERPL-SCNC: 4.7 MMOL/L (ref 3.5–5.1)
PROT SERPL-MCNC: 7.2 GM/DL (ref 6–8.4)
RBC # BLD AUTO: 4.56 M/UL (ref 4–5.4)
RELATIVE EOSINOPHIL (OHS): 3.3 %
RELATIVE LYMPHOCYTE (OHS): 16.7 % (ref 18–48)
RELATIVE MONOCYTE (OHS): 10 % (ref 4–15)
RELATIVE NEUTROPHIL (OHS): 68.1 % (ref 38–73)
SODIUM SERPL-SCNC: 137 MMOL/L (ref 136–145)
TRIGL SERPL-MCNC: 165 MG/DL (ref 30–150)
TSH SERPL-ACNC: 1.94 UIU/ML (ref 0.4–4)
WBC # BLD AUTO: 6.89 K/UL (ref 3.9–12.7)

## 2025-08-21 PROCEDURE — 82043 UR ALBUMIN QUANTITATIVE: CPT

## 2025-08-21 PROCEDURE — 84443 ASSAY THYROID STIM HORMONE: CPT

## 2025-08-21 PROCEDURE — 82465 ASSAY BLD/SERUM CHOLESTEROL: CPT

## 2025-08-21 PROCEDURE — 85025 COMPLETE CBC W/AUTO DIFF WBC: CPT

## 2025-08-21 PROCEDURE — 83036 HEMOGLOBIN GLYCOSYLATED A1C: CPT

## 2025-08-21 PROCEDURE — 84132 ASSAY OF SERUM POTASSIUM: CPT

## 2025-08-21 PROCEDURE — 36415 COLL VENOUS BLD VENIPUNCTURE: CPT | Mod: PO

## 2025-08-26 ENCOUNTER — HOSPITAL ENCOUNTER (OUTPATIENT)
Dept: RADIOLOGY | Facility: CLINIC | Age: 65
Discharge: HOME | End: 2025-08-26
Attending: STUDENT IN AN ORGANIZED HEALTH CARE EDUCATION/TRAINING PROGRAM
Payer: MEDICARE

## 2025-08-26 DIAGNOSIS — Z12.31 ENCOUNTER FOR SCREENING MAMMOGRAM FOR MALIGNANT NEOPLASM OF BREAST: ICD-10-CM

## 2025-08-26 PROCEDURE — 77067 SCR MAMMO BI INCL CAD: CPT | Mod: 26,,, | Performed by: RADIOLOGY

## 2025-08-26 PROCEDURE — 77067 SCR MAMMO BI INCL CAD: CPT | Mod: TC,PO

## 2025-08-26 PROCEDURE — 77063 BREAST TOMOSYNTHESIS BI: CPT | Mod: 26,,, | Performed by: RADIOLOGY

## 2025-08-27 ENCOUNTER — CLINICAL SUPPORT (OUTPATIENT)
Dept: REHABILITATION | Facility: HOSPITAL | Age: 65
End: 2025-08-27
Payer: MEDICARE

## 2025-08-27 DIAGNOSIS — M53.82 DECREASED ROM OF INTERVERTEBRAL DISCS OF CERVICAL SPINE: Primary | ICD-10-CM

## 2025-08-27 PROCEDURE — 97140 MANUAL THERAPY 1/> REGIONS: CPT | Mod: PN

## 2025-08-27 PROCEDURE — 97161 PT EVAL LOW COMPLEX 20 MIN: CPT | Mod: PN

## 2025-08-27 PROCEDURE — 97110 THERAPEUTIC EXERCISES: CPT | Mod: PN

## 2025-09-02 ENCOUNTER — CLINICAL SUPPORT (OUTPATIENT)
Dept: REHABILITATION | Facility: HOSPITAL | Age: 65
End: 2025-09-02
Payer: MEDICARE

## 2025-09-02 DIAGNOSIS — M53.82 DECREASED ROM OF INTERVERTEBRAL DISCS OF CERVICAL SPINE: Primary | ICD-10-CM

## 2025-09-02 PROCEDURE — 97110 THERAPEUTIC EXERCISES: CPT | Mod: PN

## 2025-09-02 PROCEDURE — 97140 MANUAL THERAPY 1/> REGIONS: CPT | Mod: PN

## 2025-09-02 PROCEDURE — 97112 NEUROMUSCULAR REEDUCATION: CPT | Mod: PN

## 2025-09-03 ENCOUNTER — CLINICAL SUPPORT (OUTPATIENT)
Dept: REHABILITATION | Facility: HOSPITAL | Age: 65
End: 2025-09-03
Payer: MEDICARE

## 2025-09-03 DIAGNOSIS — E66.01 MORBID OBESITY WITH BMI OF 40.0-44.9, ADULT: ICD-10-CM

## 2025-09-03 DIAGNOSIS — M53.82 DECREASED ROM OF INTERVERTEBRAL DISCS OF CERVICAL SPINE: Primary | ICD-10-CM

## 2025-09-03 DIAGNOSIS — R73.03 PREDIABETES: ICD-10-CM

## 2025-09-03 PROCEDURE — 97112 NEUROMUSCULAR REEDUCATION: CPT | Mod: PN

## 2025-09-03 PROCEDURE — 97140 MANUAL THERAPY 1/> REGIONS: CPT | Mod: PN

## 2025-09-03 PROCEDURE — 97110 THERAPEUTIC EXERCISES: CPT | Mod: PN

## 2025-09-03 RX ORDER — SEMAGLUTIDE 2.4 MG/.75ML
INJECTION, SOLUTION SUBCUTANEOUS
Qty: 4 ML | Refills: 11 | Status: SHIPPED | OUTPATIENT
Start: 2025-09-03